# Patient Record
Sex: MALE | Race: OTHER
[De-identification: names, ages, dates, MRNs, and addresses within clinical notes are randomized per-mention and may not be internally consistent; named-entity substitution may affect disease eponyms.]

---

## 2017-06-24 ENCOUNTER — HOSPITAL ENCOUNTER (INPATIENT)
Dept: HOSPITAL 15 - ER | Age: 52
LOS: 2 days | Discharge: HOME | DRG: 425 | End: 2017-06-26
Attending: HOSPITALIST | Admitting: INTERNAL MEDICINE
Payer: COMMERCIAL

## 2017-06-24 VITALS — HEIGHT: 70 IN | WEIGHT: 172.62 LBS | BODY MASS INDEX: 24.71 KG/M2

## 2017-06-24 DIAGNOSIS — Z79.4: ICD-10-CM

## 2017-06-24 DIAGNOSIS — Z71.89: ICD-10-CM

## 2017-06-24 DIAGNOSIS — E87.1: Primary | ICD-10-CM

## 2017-06-24 DIAGNOSIS — Z82.49: ICD-10-CM

## 2017-06-24 DIAGNOSIS — T50.2X5A: ICD-10-CM

## 2017-06-24 DIAGNOSIS — Z83.3: ICD-10-CM

## 2017-06-24 DIAGNOSIS — E83.42: ICD-10-CM

## 2017-06-24 DIAGNOSIS — K29.00: ICD-10-CM

## 2017-06-24 DIAGNOSIS — E10.42: ICD-10-CM

## 2017-06-24 DIAGNOSIS — E10.65: ICD-10-CM

## 2017-06-24 DIAGNOSIS — K21.9: ICD-10-CM

## 2017-06-24 DIAGNOSIS — F17.210: ICD-10-CM

## 2017-06-24 DIAGNOSIS — F10.20: ICD-10-CM

## 2017-06-24 LAB
ALBUMIN SERPL-MCNC: 2.9 G/DL (ref 3.4–5)
ALBUMIN SERPL-MCNC: 3.7 G/DL (ref 3.4–5)
ALP SERPL-CCNC: 74 U/L (ref 45–117)
ALP SERPL-CCNC: 89 U/L (ref 45–117)
AMYLASE SERPL-CCNC: 40 U/L (ref 25–115)
ANION GAP SERPL CALCULATED.3IONS-SCNC: 11 MMOL/L (ref 5–15)
ANION GAP SERPL CALCULATED.3IONS-SCNC: 15 MMOL/L (ref 5–15)
APTT PPP: 28.3 SEC (ref 22.64–33.71)
BILIRUB SERPL-MCNC: 0.5 MG/DL (ref 0.2–1)
BILIRUB SERPL-MCNC: 0.9 MG/DL (ref 0.2–1)
BUN SERPL-MCNC: 7 MG/DL (ref 7–18)
BUN SERPL-MCNC: 8 MG/DL (ref 7–18)
BUN/CREAT SERPL: 11
BUN/CREAT SERPL: 11.3
CALCIUM SERPL-MCNC: 7.4 MG/DL (ref 8.5–10.1)
CALCIUM SERPL-MCNC: 8.5 MG/DL (ref 8.5–10.1)
CHLORIDE SERPL-SCNC: 78 MMOL/L (ref 98–107)
CHLORIDE SERPL-SCNC: 89 MMOL/L (ref 98–107)
CO2 SERPL-SCNC: 19 MMOL/L (ref 21–32)
CO2 SERPL-SCNC: 21 MMOL/L (ref 21–32)
GLUCOSE SERPL-MCNC: 235 MG/DL (ref 74–106)
GLUCOSE SERPL-MCNC: 269 MG/DL (ref 74–106)
HCT VFR BLD AUTO: 41.5 % (ref 41–53)
HGB BLD-MCNC: 14.4 G/DL (ref 13.5–17.5)
INR PPP: 1.03 (ref 0.9–1.15)
MAGNESIUM SERPL-MCNC: 1.2 MG/DL (ref 1.6–2.6)
MCH RBC QN AUTO: 34.6 PG (ref 28–32)
MCV RBC AUTO: 99.7 FL (ref 80–100)
NRBC BLD QL AUTO: 0 %
POTASSIUM SERPL-SCNC: 3.3 MMOL/L (ref 3.5–5.1)
POTASSIUM SERPL-SCNC: 4.3 MMOL/L (ref 3.5–5.1)
PROT SERPL-MCNC: 6.4 G/DL (ref 6.4–8.2)
PROT SERPL-MCNC: 7.8 G/DL (ref 6.4–8.2)
PROTHROMBIN TIME: 11.2 SEC (ref 9.37–12.3)
SODIUM SERPL-SCNC: 112 MMOL/L (ref 136–145)
SODIUM SERPL-SCNC: 121 MMOL/L (ref 136–145)

## 2017-06-24 PROCEDURE — 84295 ASSAY OF SERUM SODIUM: CPT

## 2017-06-24 PROCEDURE — 85730 THROMBOPLASTIN TIME PARTIAL: CPT

## 2017-06-24 PROCEDURE — 86141 C-REACTIVE PROTEIN HS: CPT

## 2017-06-24 PROCEDURE — 82607 VITAMIN B-12: CPT

## 2017-06-24 PROCEDURE — 82746 ASSAY OF FOLIC ACID SERUM: CPT

## 2017-06-24 PROCEDURE — 80061 LIPID PANEL: CPT

## 2017-06-24 PROCEDURE — 80048 BASIC METABOLIC PNL TOTAL CA: CPT

## 2017-06-24 PROCEDURE — 81001 URINALYSIS AUTO W/SCOPE: CPT

## 2017-06-24 PROCEDURE — 70450 CT HEAD/BRAIN W/O DYE: CPT

## 2017-06-24 PROCEDURE — 96367 TX/PROPH/DG ADDL SEQ IV INF: CPT

## 2017-06-24 PROCEDURE — 84443 ASSAY THYROID STIM HORMONE: CPT

## 2017-06-24 PROCEDURE — 93005 ELECTROCARDIOGRAM TRACING: CPT

## 2017-06-24 PROCEDURE — 36415 COLL VENOUS BLD VENIPUNCTURE: CPT

## 2017-06-24 PROCEDURE — 82962 GLUCOSE BLOOD TEST: CPT

## 2017-06-24 PROCEDURE — 80053 COMPREHEN METABOLIC PANEL: CPT

## 2017-06-24 PROCEDURE — 93306 TTE W/DOPPLER COMPLETE: CPT

## 2017-06-24 PROCEDURE — 96365 THER/PROPH/DIAG IV INF INIT: CPT

## 2017-06-24 PROCEDURE — 83036 HEMOGLOBIN GLYCOSYLATED A1C: CPT

## 2017-06-24 PROCEDURE — 86592 SYPHILIS TEST NON-TREP QUAL: CPT

## 2017-06-24 PROCEDURE — 80320 DRUG SCREEN QUANTALCOHOLS: CPT

## 2017-06-24 PROCEDURE — 96375 TX/PRO/DX INJ NEW DRUG ADDON: CPT

## 2017-06-24 PROCEDURE — 85610 PROTHROMBIN TIME: CPT

## 2017-06-24 PROCEDURE — 83735 ASSAY OF MAGNESIUM: CPT

## 2017-06-24 PROCEDURE — 71010: CPT

## 2017-06-24 PROCEDURE — 84484 ASSAY OF TROPONIN QUANT: CPT

## 2017-06-24 PROCEDURE — 96361 HYDRATE IV INFUSION ADD-ON: CPT

## 2017-06-24 PROCEDURE — 94761 N-INVAS EAR/PLS OXIMETRY MLT: CPT

## 2017-06-24 PROCEDURE — 85025 COMPLETE CBC W/AUTO DIFF WBC: CPT

## 2017-06-24 PROCEDURE — 87086 URINE CULTURE/COLONY COUNT: CPT

## 2017-06-24 PROCEDURE — 82150 ASSAY OF AMYLASE: CPT

## 2017-06-24 PROCEDURE — 84425 ASSAY OF VITAMIN B-1: CPT

## 2017-06-24 PROCEDURE — 83690 ASSAY OF LIPASE: CPT

## 2017-06-24 PROCEDURE — 85652 RBC SED RATE AUTOMATED: CPT

## 2017-06-24 RX ADMIN — SODIUM CHLORIDE SCH MLS/HR: 0.9 INJECTION, SOLUTION INTRAVENOUS at 22:26

## 2017-06-24 RX ADMIN — MORPHINE SULFATE PRN MG: 2 INJECTION, SOLUTION INTRAMUSCULAR; INTRAVENOUS at 18:18

## 2017-06-24 RX ADMIN — SODIUM CHLORIDE SCH MLS/HR: 0.9 INJECTION, SOLUTION INTRAVENOUS at 22:22

## 2017-06-24 RX ADMIN — HUMAN INSULIN SCH UNITS: 100 INJECTION, SOLUTION SUBCUTANEOUS at 20:00

## 2017-06-24 RX ADMIN — Medication SCH STRIP: at 16:59

## 2017-06-24 RX ADMIN — SODIUM CHLORIDE SCH MG: 9 INJECTION, SOLUTION INTRAVENOUS at 16:05

## 2017-06-24 RX ADMIN — MAGNESIUM SULFATE IN DEXTROSE SCH MLS/HR: 10 INJECTION, SOLUTION INTRAVENOUS at 17:08

## 2017-06-24 RX ADMIN — MAGNESIUM SULFATE IN DEXTROSE SCH MLS/HR: 10 INJECTION, SOLUTION INTRAVENOUS at 18:48

## 2017-06-24 RX ADMIN — Medication SCH STRIP: at 20:00

## 2017-06-24 RX ADMIN — SODIUM CHLORIDE SCH MLS/HR: 0.9 INJECTION, SOLUTION INTRAVENOUS at 16:49

## 2017-06-24 RX ADMIN — HUMAN INSULIN SCH UNITS: 100 INJECTION, SOLUTION SUBCUTANEOUS at 17:05

## 2017-06-25 VITALS — DIASTOLIC BLOOD PRESSURE: 81 MMHG | SYSTOLIC BLOOD PRESSURE: 136 MMHG

## 2017-06-25 VITALS — SYSTOLIC BLOOD PRESSURE: 60 MMHG

## 2017-06-25 VITALS — DIASTOLIC BLOOD PRESSURE: 92 MMHG | SYSTOLIC BLOOD PRESSURE: 140 MMHG

## 2017-06-25 VITALS — SYSTOLIC BLOOD PRESSURE: 140 MMHG | DIASTOLIC BLOOD PRESSURE: 92 MMHG

## 2017-06-25 VITALS — DIASTOLIC BLOOD PRESSURE: 80 MMHG | SYSTOLIC BLOOD PRESSURE: 143 MMHG

## 2017-06-25 VITALS — DIASTOLIC BLOOD PRESSURE: 85 MMHG | SYSTOLIC BLOOD PRESSURE: 140 MMHG

## 2017-06-25 VITALS — DIASTOLIC BLOOD PRESSURE: 58 MMHG | SYSTOLIC BLOOD PRESSURE: 114 MMHG

## 2017-06-25 VITALS — SYSTOLIC BLOOD PRESSURE: 154 MMHG | DIASTOLIC BLOOD PRESSURE: 84 MMHG

## 2017-06-25 LAB
ALBUMIN SERPL-MCNC: 2.8 G/DL (ref 3.4–5)
ALP SERPL-CCNC: 68 U/L (ref 45–117)
ANION GAP SERPL CALCULATED.3IONS-SCNC: 11 MMOL/L (ref 5–15)
BILIRUB SERPL-MCNC: 0.4 MG/DL (ref 0.2–1)
BUN SERPL-MCNC: 6 MG/DL (ref 7–18)
BUN/CREAT SERPL: 10.3
CALCIUM SERPL-MCNC: 7.3 MG/DL (ref 8.5–10.1)
CHLORIDE SERPL-SCNC: 89 MMOL/L (ref 98–107)
CHOLEST SERPL-MCNC: 117 MG/DL (ref ?–200)
CO2 SERPL-SCNC: 22 MMOL/L (ref 21–32)
GLUCOSE SERPL-MCNC: 100 MG/DL (ref 74–106)
HCT VFR BLD AUTO: 36.8 % (ref 41–53)
HDLC SERPL-MCNC: 68 MG/DL (ref 40–59)
HGB BLD-MCNC: 12.7 G/DL (ref 13.5–17.5)
MCH RBC QN AUTO: 34.7 PG (ref 28–32)
MCV RBC AUTO: 100.9 FL (ref 80–100)
NRBC BLD QL AUTO: 0 %
POTASSIUM SERPL-SCNC: 2.8 MMOL/L (ref 3.5–5.1)
PROT SERPL-MCNC: 6 G/DL (ref 6.4–8.2)
SODIUM SERPL-SCNC: 122 MMOL/L (ref 136–145)
TRIGL SERPL-MCNC: 73 MG/DL (ref ?–150)

## 2017-06-25 RX ADMIN — SODIUM CHLORIDE SCH MG: 9 INJECTION, SOLUTION INTRAVENOUS at 13:21

## 2017-06-25 RX ADMIN — MORPHINE SULFATE PRN MG: 2 INJECTION, SOLUTION INTRAMUSCULAR; INTRAVENOUS at 12:06

## 2017-06-25 RX ADMIN — MORPHINE SULFATE PRN MG: 2 INJECTION, SOLUTION INTRAMUSCULAR; INTRAVENOUS at 06:50

## 2017-06-25 RX ADMIN — SODIUM CHLORIDE SCH MG: 9 INJECTION, SOLUTION INTRAVENOUS at 02:55

## 2017-06-25 RX ADMIN — HUMAN INSULIN SCH UNITS: 100 INJECTION, SOLUTION SUBCUTANEOUS at 08:00

## 2017-06-25 RX ADMIN — MAGNESIUM SULFATE IN DEXTROSE SCH MLS/HR: 10 INJECTION, SOLUTION INTRAVENOUS at 15:49

## 2017-06-25 RX ADMIN — MORPHINE SULFATE PRN MG: 2 INJECTION, SOLUTION INTRAMUSCULAR; INTRAVENOUS at 15:50

## 2017-06-25 RX ADMIN — HUMAN INSULIN SCH UNITS: 100 INJECTION, SOLUTION SUBCUTANEOUS at 20:24

## 2017-06-25 RX ADMIN — HUMAN INSULIN SCH UNITS: 100 INJECTION, SOLUTION SUBCUTANEOUS at 12:00

## 2017-06-25 RX ADMIN — Medication SCH STRIP: at 03:43

## 2017-06-25 RX ADMIN — MORPHINE SULFATE PRN MG: 2 INJECTION, SOLUTION INTRAMUSCULAR; INTRAVENOUS at 20:25

## 2017-06-25 RX ADMIN — HUMAN INSULIN SCH UNITS: 100 INJECTION, SOLUTION SUBCUTANEOUS at 00:00

## 2017-06-25 RX ADMIN — SODIUM CHLORIDE SCH MLS/HR: 0.9 INJECTION, SOLUTION INTRAVENOUS at 21:45

## 2017-06-25 RX ADMIN — Medication SCH STRIP: at 12:05

## 2017-06-25 RX ADMIN — HUMAN INSULIN SCH UNITS: 100 INJECTION, SOLUTION SUBCUTANEOUS at 03:43

## 2017-06-25 RX ADMIN — HUMAN INSULIN SCH UNITS: 100 INJECTION, SOLUTION SUBCUTANEOUS at 16:00

## 2017-06-25 RX ADMIN — SODIUM CHLORIDE SCH MLS/HR: 0.9 INJECTION, SOLUTION INTRAVENOUS at 02:21

## 2017-06-25 RX ADMIN — Medication SCH STRIP: at 15:49

## 2017-06-25 RX ADMIN — Medication SCH STRIP: at 20:24

## 2017-06-25 RX ADMIN — SODIUM CHLORIDE SCH MG: 9 INJECTION, SOLUTION INTRAVENOUS at 09:28

## 2017-06-25 RX ADMIN — Medication SCH STRIP: at 08:36

## 2017-06-25 RX ADMIN — MAGNESIUM SULFATE IN DEXTROSE SCH MLS/HR: 10 INJECTION, SOLUTION INTRAVENOUS at 17:21

## 2017-06-25 RX ADMIN — MORPHINE SULFATE PRN MG: 2 INJECTION, SOLUTION INTRAMUSCULAR; INTRAVENOUS at 02:21

## 2017-06-25 RX ADMIN — Medication SCH STRIP: at 00:23

## 2017-06-26 VITALS — DIASTOLIC BLOOD PRESSURE: 77 MMHG | SYSTOLIC BLOOD PRESSURE: 132 MMHG

## 2017-06-26 VITALS — SYSTOLIC BLOOD PRESSURE: 132 MMHG | DIASTOLIC BLOOD PRESSURE: 77 MMHG

## 2017-06-26 VITALS — SYSTOLIC BLOOD PRESSURE: 139 MMHG | DIASTOLIC BLOOD PRESSURE: 82 MMHG

## 2017-06-26 LAB
ANION GAP SERPL CALCULATED.3IONS-SCNC: 6 MMOL/L (ref 5–15)
BUN SERPL-MCNC: 10 MG/DL (ref 7–18)
BUN/CREAT SERPL: 12.7
CALCIUM SERPL-MCNC: 8.6 MG/DL (ref 8.5–10.1)
CHLORIDE SERPL-SCNC: 102 MMOL/L (ref 98–107)
CO2 SERPL-SCNC: 25 MMOL/L (ref 21–32)
GLUCOSE SERPL-MCNC: 152 MG/DL (ref 74–106)
HCT VFR BLD AUTO: 38.3 % (ref 41–53)
HGB BLD-MCNC: 13.1 G/DL (ref 13.5–17.5)
MCH RBC QN AUTO: 34.7 PG (ref 28–32)
MCV RBC AUTO: 101.4 FL (ref 80–100)
NRBC BLD QL AUTO: 0 %
POTASSIUM SERPL-SCNC: 4.9 MMOL/L (ref 3.5–5.1)
SODIUM SERPL-SCNC: 133 MMOL/L (ref 136–145)
TEMPERATURE:: 24.1 C (ref 20–25)
VIT B12 SERPL-MCNC: 442 PG/ML (ref 211–911)

## 2017-06-26 RX ADMIN — SODIUM CHLORIDE SCH MG: 9 INJECTION, SOLUTION INTRAVENOUS at 04:05

## 2017-06-26 RX ADMIN — Medication SCH STRIP: at 08:00

## 2017-06-26 RX ADMIN — MORPHINE SULFATE PRN MG: 2 INJECTION, SOLUTION INTRAMUSCULAR; INTRAVENOUS at 00:23

## 2017-06-26 RX ADMIN — HUMAN INSULIN SCH UNITS: 100 INJECTION, SOLUTION SUBCUTANEOUS at 00:00

## 2017-06-26 RX ADMIN — HUMAN INSULIN SCH UNITS: 100 INJECTION, SOLUTION SUBCUTANEOUS at 09:11

## 2017-06-26 RX ADMIN — MORPHINE SULFATE PRN MG: 2 INJECTION, SOLUTION INTRAMUSCULAR; INTRAVENOUS at 08:36

## 2017-06-26 RX ADMIN — Medication SCH STRIP: at 00:22

## 2017-06-26 RX ADMIN — MORPHINE SULFATE PRN MG: 2 INJECTION, SOLUTION INTRAMUSCULAR; INTRAVENOUS at 04:25

## 2017-06-26 RX ADMIN — Medication SCH STRIP: at 12:00

## 2017-06-26 RX ADMIN — SODIUM CHLORIDE SCH MLS/HR: 0.9 INJECTION, SOLUTION INTRAVENOUS at 07:45

## 2017-06-26 RX ADMIN — Medication SCH STRIP: at 04:23

## 2017-06-26 RX ADMIN — HUMAN INSULIN SCH UNITS: 100 INJECTION, SOLUTION SUBCUTANEOUS at 04:00

## 2017-06-26 RX ADMIN — HUMAN INSULIN SCH UNITS: 100 INJECTION, SOLUTION SUBCUTANEOUS at 12:00

## 2017-06-26 RX ADMIN — SODIUM CHLORIDE SCH MG: 9 INJECTION, SOLUTION INTRAVENOUS at 10:00

## 2017-06-29 LAB — VIT B1 BLD-SCNC: 240.4 NMOL/L (ref 66.5–200)

## 2017-10-29 ENCOUNTER — HOSPITAL ENCOUNTER (INPATIENT)
Dept: HOSPITAL 15 - ER | Age: 52
LOS: 4 days | Discharge: HOME | DRG: 52 | End: 2017-11-02
Attending: INTERNAL MEDICINE | Admitting: INTERNAL MEDICINE
Payer: MEDICAID

## 2017-10-29 VITALS — DIASTOLIC BLOOD PRESSURE: 76 MMHG | SYSTOLIC BLOOD PRESSURE: 128 MMHG

## 2017-10-29 VITALS — HEIGHT: 70 IN | WEIGHT: 170.64 LBS | BODY MASS INDEX: 24.43 KG/M2

## 2017-10-29 VITALS — DIASTOLIC BLOOD PRESSURE: 96 MMHG | SYSTOLIC BLOOD PRESSURE: 150 MMHG

## 2017-10-29 DIAGNOSIS — F10.231: ICD-10-CM

## 2017-10-29 DIAGNOSIS — G40.509: ICD-10-CM

## 2017-10-29 DIAGNOSIS — Z79.899: ICD-10-CM

## 2017-10-29 DIAGNOSIS — Z83.3: ICD-10-CM

## 2017-10-29 DIAGNOSIS — K21.9: ICD-10-CM

## 2017-10-29 DIAGNOSIS — I10: ICD-10-CM

## 2017-10-29 DIAGNOSIS — E11.65: ICD-10-CM

## 2017-10-29 DIAGNOSIS — F17.210: ICD-10-CM

## 2017-10-29 DIAGNOSIS — F10.21: ICD-10-CM

## 2017-10-29 DIAGNOSIS — Z90.89: ICD-10-CM

## 2017-10-29 DIAGNOSIS — J44.9: ICD-10-CM

## 2017-10-29 DIAGNOSIS — Z82.49: ICD-10-CM

## 2017-10-29 DIAGNOSIS — G93.41: Primary | ICD-10-CM

## 2017-10-29 LAB
ALBUMIN SERPL-MCNC: 3.8 G/DL (ref 3.4–5)
ALCOHOL, URINE: < 3 MG/DL (ref 0–5)
ALP SERPL-CCNC: 81 U/L (ref 45–117)
ALT SERPL-CCNC: 39 U/L (ref 16–61)
AMPHETAMINES UR QL SCN: NEGATIVE
ANION GAP SERPL CALCULATED.3IONS-SCNC: 10 MMOL/L (ref 5–15)
BARBITURATES UR QL SCN: NEGATIVE
BENZODIAZ UR QL SCN: NEGATIVE
BILIRUB SERPL-MCNC: 0.3 MG/DL (ref 0.2–1)
BUN SERPL-MCNC: 14 MG/DL (ref 7–18)
BUN/CREAT SERPL: 15.7
BZE UR QL SCN: NEGATIVE
CALCIUM SERPL-MCNC: 9.1 MG/DL (ref 8.5–10.1)
CANNABINOIDS UR QL SCN: NEGATIVE
CHLORIDE SERPL-SCNC: 106 MMOL/L (ref 98–107)
CO2 SERPL-SCNC: 24 MMOL/L (ref 21–32)
GLUCOSE SERPL-MCNC: 182 MG/DL (ref 74–106)
HCT VFR BLD AUTO: 43.6 % (ref 41–53)
HGB BLD-MCNC: 15 G/DL (ref 13.5–17.5)
MAGNESIUM SERPL-MCNC: 2 MG/DL (ref 1.6–2.6)
MCH RBC QN AUTO: 34.5 PG (ref 28–32)
MCV RBC AUTO: 100.2 FL (ref 80–100)
NRBC BLD QL AUTO: 0.1 %
OPIATES UR QL SCN: NEGATIVE
PCP UR QL SCN: NEGATIVE
POTASSIUM SERPL-SCNC: 4.2 MMOL/L (ref 3.5–5.1)
PROT SERPL-MCNC: 8.3 G/DL (ref 6.4–8.2)
SODIUM SERPL-SCNC: 140 MMOL/L (ref 136–145)

## 2017-10-29 PROCEDURE — 80053 COMPREHEN METABOLIC PANEL: CPT

## 2017-10-29 PROCEDURE — 36415 COLL VENOUS BLD VENIPUNCTURE: CPT

## 2017-10-29 PROCEDURE — 70450 CT HEAD/BRAIN W/O DYE: CPT

## 2017-10-29 PROCEDURE — 81001 URINALYSIS AUTO W/SCOPE: CPT

## 2017-10-29 PROCEDURE — 82607 VITAMIN B-12: CPT

## 2017-10-29 PROCEDURE — 80307 DRUG TEST PRSMV CHEM ANLYZR: CPT

## 2017-10-29 PROCEDURE — 93005 ELECTROCARDIOGRAM TRACING: CPT

## 2017-10-29 PROCEDURE — 96374 THER/PROPH/DIAG INJ IV PUSH: CPT

## 2017-10-29 PROCEDURE — 83735 ASSAY OF MAGNESIUM: CPT

## 2017-10-29 PROCEDURE — 84443 ASSAY THYROID STIM HORMONE: CPT

## 2017-10-29 PROCEDURE — 90715 TDAP VACCINE 7 YRS/> IM: CPT

## 2017-10-29 PROCEDURE — 96372 THER/PROPH/DIAG INJ SC/IM: CPT

## 2017-10-29 PROCEDURE — 85025 COMPLETE CBC W/AUTO DIFF WBC: CPT

## 2017-10-29 PROCEDURE — 82962 GLUCOSE BLOOD TEST: CPT

## 2017-10-29 PROCEDURE — 84484 ASSAY OF TROPONIN QUANT: CPT

## 2017-10-29 PROCEDURE — 80320 DRUG SCREEN QUANTALCOHOLS: CPT

## 2017-10-29 PROCEDURE — 83036 HEMOGLOBIN GLYCOSYLATED A1C: CPT

## 2017-10-29 RX ADMIN — FAMOTIDINE SCH MG: 20 TABLET, FILM COATED ORAL at 22:45

## 2017-10-29 RX ADMIN — SODIUM CHLORIDE SCH MLS/HR: 0.9 INJECTION, SOLUTION INTRAVENOUS at 23:17

## 2017-10-29 RX ADMIN — HUMAN INSULIN SCH UNITS: 100 INJECTION, SOLUTION SUBCUTANEOUS at 22:00

## 2017-10-29 RX ADMIN — ATENOLOL SCH MG: 25 TABLET ORAL at 22:44

## 2017-10-29 RX ADMIN — SODIUM CHLORIDE SCH MLS/HR: 0.9 INJECTION, SOLUTION INTRAVENOUS at 17:40

## 2017-10-29 RX ADMIN — GABAPENTIN SCH MG: 300 CAPSULE ORAL at 22:45

## 2017-10-29 RX ADMIN — Medication SCH GM: at 22:45

## 2017-10-29 RX ADMIN — Medication SCH GM: at 17:00

## 2017-10-29 RX ADMIN — AMITRIPTYLINE HYDROCHLORIDE SCH MG: 25 TABLET, FILM COATED ORAL at 22:45

## 2017-10-29 RX ADMIN — Medication SCH STRIP: at 17:40

## 2017-10-29 RX ADMIN — HUMAN INSULIN SCH UNITS: 100 INJECTION, SOLUTION SUBCUTANEOUS at 17:40

## 2017-10-29 RX ADMIN — Medication SCH STRIP: at 22:00

## 2017-10-30 VITALS — DIASTOLIC BLOOD PRESSURE: 97 MMHG | SYSTOLIC BLOOD PRESSURE: 144 MMHG

## 2017-10-30 VITALS — DIASTOLIC BLOOD PRESSURE: 70 MMHG | SYSTOLIC BLOOD PRESSURE: 116 MMHG

## 2017-10-30 VITALS — DIASTOLIC BLOOD PRESSURE: 88 MMHG | SYSTOLIC BLOOD PRESSURE: 132 MMHG

## 2017-10-30 VITALS — SYSTOLIC BLOOD PRESSURE: 144 MMHG | DIASTOLIC BLOOD PRESSURE: 88 MMHG

## 2017-10-30 VITALS — DIASTOLIC BLOOD PRESSURE: 89 MMHG | SYSTOLIC BLOOD PRESSURE: 133 MMHG

## 2017-10-30 LAB
ALBUMIN SERPL-MCNC: 3 G/DL (ref 3.4–5)
ALP SERPL-CCNC: 68 U/L (ref 45–117)
ALT SERPL-CCNC: 32 U/L (ref 16–61)
ANION GAP SERPL CALCULATED.3IONS-SCNC: 7 MMOL/L (ref 5–15)
BILIRUB SERPL-MCNC: 0.4 MG/DL (ref 0.2–1)
BUN SERPL-MCNC: 16 MG/DL (ref 7–18)
BUN/CREAT SERPL: 20
CALCIUM SERPL-MCNC: 8.2 MG/DL (ref 8.5–10.1)
CHLORIDE SERPL-SCNC: 107 MMOL/L (ref 98–107)
CO2 SERPL-SCNC: 26 MMOL/L (ref 21–32)
GLUCOSE SERPL-MCNC: 96 MG/DL (ref 74–106)
HCT VFR BLD AUTO: 38.7 % (ref 41–53)
HGB BLD-MCNC: 12.9 G/DL (ref 13.5–17.5)
MCH RBC QN AUTO: 33.4 PG (ref 28–32)
MCV RBC AUTO: 100.2 FL (ref 80–100)
NRBC BLD QL AUTO: 0.1 %
POTASSIUM SERPL-SCNC: 4.1 MMOL/L (ref 3.5–5.1)
PROT SERPL-MCNC: 6.8 G/DL (ref 6.4–8.2)
SODIUM SERPL-SCNC: 140 MMOL/L (ref 136–145)

## 2017-10-30 RX ADMIN — Medication SCH STRIP: at 22:22

## 2017-10-30 RX ADMIN — Medication SCH STRIP: at 18:29

## 2017-10-30 RX ADMIN — PANTOPRAZOLE SODIUM SCH MG: 40 TABLET, DELAYED RELEASE ORAL at 09:49

## 2017-10-30 RX ADMIN — SODIUM CHLORIDE SCH MLS/HR: 0.9 INJECTION, SOLUTION INTRAVENOUS at 17:08

## 2017-10-30 RX ADMIN — GABAPENTIN SCH MG: 300 CAPSULE ORAL at 22:04

## 2017-10-30 RX ADMIN — MULTIVITAMIN TABLET SCH TAB: TABLET at 09:49

## 2017-10-30 RX ADMIN — ATENOLOL SCH MG: 25 TABLET ORAL at 22:22

## 2017-10-30 RX ADMIN — FAMOTIDINE SCH MG: 20 TABLET, FILM COATED ORAL at 09:49

## 2017-10-30 RX ADMIN — VITAMIN D, TAB 1000IU (100/BT) SCH UNIT: 25 TAB at 09:50

## 2017-10-30 RX ADMIN — HUMAN INSULIN SCH UNITS: 100 INJECTION, SOLUTION SUBCUTANEOUS at 11:32

## 2017-10-30 RX ADMIN — AMITRIPTYLINE HYDROCHLORIDE SCH MG: 25 TABLET, FILM COATED ORAL at 22:04

## 2017-10-30 RX ADMIN — Medication SCH STRIP: at 11:31

## 2017-10-30 RX ADMIN — SODIUM CHLORIDE SCH MLS/HR: 0.9 INJECTION, SOLUTION INTRAVENOUS at 08:01

## 2017-10-30 RX ADMIN — FAMOTIDINE SCH MG: 20 TABLET, FILM COATED ORAL at 22:05

## 2017-10-30 RX ADMIN — GABAPENTIN SCH MG: 300 CAPSULE ORAL at 14:35

## 2017-10-30 RX ADMIN — Medication SCH GM: at 22:21

## 2017-10-30 RX ADMIN — Medication SCH GM: at 11:31

## 2017-10-30 RX ADMIN — HUMAN INSULIN SCH UNITS: 100 INJECTION, SOLUTION SUBCUTANEOUS at 18:29

## 2017-10-30 RX ADMIN — Medication SCH GM: at 17:22

## 2017-10-30 RX ADMIN — ATENOLOL SCH MG: 25 TABLET ORAL at 10:00

## 2017-10-30 RX ADMIN — Medication SCH STRIP: at 06:25

## 2017-10-30 RX ADMIN — GABAPENTIN SCH MG: 300 CAPSULE ORAL at 06:25

## 2017-10-30 RX ADMIN — Medication SCH GM: at 06:25

## 2017-10-30 RX ADMIN — HUMAN INSULIN SCH UNITS: 100 INJECTION, SOLUTION SUBCUTANEOUS at 22:22

## 2017-10-30 RX ADMIN — HUMAN INSULIN SCH UNITS: 100 INJECTION, SOLUTION SUBCUTANEOUS at 06:25

## 2017-10-31 VITALS — DIASTOLIC BLOOD PRESSURE: 87 MMHG | SYSTOLIC BLOOD PRESSURE: 160 MMHG

## 2017-10-31 VITALS — SYSTOLIC BLOOD PRESSURE: 157 MMHG | DIASTOLIC BLOOD PRESSURE: 92 MMHG

## 2017-10-31 VITALS — SYSTOLIC BLOOD PRESSURE: 156 MMHG | DIASTOLIC BLOOD PRESSURE: 92 MMHG

## 2017-10-31 VITALS — SYSTOLIC BLOOD PRESSURE: 171 MMHG | DIASTOLIC BLOOD PRESSURE: 98 MMHG

## 2017-10-31 VITALS — DIASTOLIC BLOOD PRESSURE: 96 MMHG | SYSTOLIC BLOOD PRESSURE: 149 MMHG

## 2017-10-31 RX ADMIN — Medication SCH STRIP: at 22:00

## 2017-10-31 RX ADMIN — AMITRIPTYLINE HYDROCHLORIDE SCH MG: 25 TABLET, FILM COATED ORAL at 23:23

## 2017-10-31 RX ADMIN — HUMAN INSULIN SCH UNITS: 100 INJECTION, SOLUTION SUBCUTANEOUS at 22:00

## 2017-10-31 RX ADMIN — HUMAN INSULIN SCH UNITS: 100 INJECTION, SOLUTION SUBCUTANEOUS at 17:00

## 2017-10-31 RX ADMIN — FAMOTIDINE SCH MG: 20 TABLET, FILM COATED ORAL at 10:00

## 2017-10-31 RX ADMIN — HUMAN INSULIN SCH UNITS: 100 INJECTION, SOLUTION SUBCUTANEOUS at 11:30

## 2017-10-31 RX ADMIN — ATENOLOL SCH MG: 25 TABLET ORAL at 10:00

## 2017-10-31 RX ADMIN — SODIUM CHLORIDE SCH MLS/HR: 0.9 INJECTION, SOLUTION INTRAVENOUS at 06:36

## 2017-10-31 RX ADMIN — SODIUM CHLORIDE SCH MLS/HR: 0.9 INJECTION, SOLUTION INTRAVENOUS at 17:01

## 2017-10-31 RX ADMIN — Medication SCH STRIP: at 17:00

## 2017-10-31 RX ADMIN — GABAPENTIN SCH MG: 300 CAPSULE ORAL at 05:44

## 2017-10-31 RX ADMIN — HUMAN INSULIN SCH UNITS: 100 INJECTION, SOLUTION SUBCUTANEOUS at 06:05

## 2017-10-31 RX ADMIN — Medication SCH GM: at 17:00

## 2017-10-31 RX ADMIN — ATENOLOL SCH MG: 25 TABLET ORAL at 23:29

## 2017-10-31 RX ADMIN — Medication SCH STRIP: at 06:05

## 2017-10-31 RX ADMIN — GABAPENTIN SCH MG: 300 CAPSULE ORAL at 14:00

## 2017-10-31 RX ADMIN — FAMOTIDINE SCH MG: 20 TABLET, FILM COATED ORAL at 23:25

## 2017-10-31 RX ADMIN — Medication SCH GM: at 23:25

## 2017-10-31 RX ADMIN — SODIUM CHLORIDE SCH MLS/HR: 0.9 INJECTION, SOLUTION INTRAVENOUS at 00:06

## 2017-10-31 RX ADMIN — Medication SCH STRIP: at 11:30

## 2017-10-31 RX ADMIN — GABAPENTIN SCH MG: 300 CAPSULE ORAL at 23:24

## 2017-10-31 RX ADMIN — MULTIVITAMIN TABLET SCH TAB: TABLET at 10:00

## 2017-10-31 RX ADMIN — Medication SCH GM: at 11:14

## 2017-10-31 RX ADMIN — Medication SCH GM: at 06:05

## 2017-10-31 RX ADMIN — PANTOPRAZOLE SODIUM SCH MG: 40 TABLET, DELAYED RELEASE ORAL at 10:00

## 2017-10-31 RX ADMIN — VITAMIN D, TAB 1000IU (100/BT) SCH UNIT: 25 TAB at 10:00

## 2017-11-01 VITALS — DIASTOLIC BLOOD PRESSURE: 104 MMHG | SYSTOLIC BLOOD PRESSURE: 178 MMHG

## 2017-11-01 VITALS — SYSTOLIC BLOOD PRESSURE: 172 MMHG | DIASTOLIC BLOOD PRESSURE: 99 MMHG

## 2017-11-01 VITALS — DIASTOLIC BLOOD PRESSURE: 77 MMHG | SYSTOLIC BLOOD PRESSURE: 126 MMHG

## 2017-11-01 VITALS — SYSTOLIC BLOOD PRESSURE: 170 MMHG | DIASTOLIC BLOOD PRESSURE: 99 MMHG

## 2017-11-01 RX ADMIN — GABAPENTIN SCH MG: 300 CAPSULE ORAL at 16:00

## 2017-11-01 RX ADMIN — MULTIVITAMIN TABLET SCH TAB: TABLET at 09:19

## 2017-11-01 RX ADMIN — HUMAN INSULIN SCH UNITS: 100 INJECTION, SOLUTION SUBCUTANEOUS at 17:00

## 2017-11-01 RX ADMIN — PANTOPRAZOLE SODIUM SCH MG: 40 TABLET, DELAYED RELEASE ORAL at 09:19

## 2017-11-01 RX ADMIN — Medication SCH STRIP: at 22:00

## 2017-11-01 RX ADMIN — VITAMIN D, TAB 1000IU (100/BT) SCH UNIT: 25 TAB at 09:19

## 2017-11-01 RX ADMIN — Medication SCH STRIP: at 07:00

## 2017-11-01 RX ADMIN — AMITRIPTYLINE HYDROCHLORIDE SCH MG: 25 TABLET, FILM COATED ORAL at 21:55

## 2017-11-01 RX ADMIN — Medication SCH GM: at 21:53

## 2017-11-01 RX ADMIN — Medication SCH GM: at 06:50

## 2017-11-01 RX ADMIN — Medication SCH GM: at 12:03

## 2017-11-01 RX ADMIN — GABAPENTIN SCH MG: 300 CAPSULE ORAL at 21:53

## 2017-11-01 RX ADMIN — Medication SCH STRIP: at 12:03

## 2017-11-01 RX ADMIN — HUMAN INSULIN SCH UNITS: 100 INJECTION, SOLUTION SUBCUTANEOUS at 06:58

## 2017-11-01 RX ADMIN — Medication SCH GM: at 17:41

## 2017-11-01 RX ADMIN — Medication SCH STRIP: at 17:40

## 2017-11-01 RX ADMIN — SODIUM CHLORIDE SCH MLS/HR: 0.9 INJECTION, SOLUTION INTRAVENOUS at 09:26

## 2017-11-01 RX ADMIN — SODIUM CHLORIDE SCH MLS/HR: 0.9 INJECTION, SOLUTION INTRAVENOUS at 01:06

## 2017-11-01 RX ADMIN — ATENOLOL SCH MG: 25 TABLET ORAL at 21:54

## 2017-11-01 RX ADMIN — FAMOTIDINE SCH MG: 20 TABLET, FILM COATED ORAL at 21:53

## 2017-11-01 RX ADMIN — FAMOTIDINE SCH MG: 20 TABLET, FILM COATED ORAL at 09:20

## 2017-11-01 RX ADMIN — GABAPENTIN SCH MG: 300 CAPSULE ORAL at 06:27

## 2017-11-01 RX ADMIN — HUMAN INSULIN SCH UNITS: 100 INJECTION, SOLUTION SUBCUTANEOUS at 12:03

## 2017-11-01 RX ADMIN — HUMAN INSULIN SCH UNITS: 100 INJECTION, SOLUTION SUBCUTANEOUS at 22:00

## 2017-11-01 RX ADMIN — ATENOLOL SCH MG: 25 TABLET ORAL at 09:21

## 2017-11-01 RX ADMIN — SODIUM CHLORIDE SCH MLS/HR: 0.9 INJECTION, SOLUTION INTRAVENOUS at 18:31

## 2017-11-02 VITALS — DIASTOLIC BLOOD PRESSURE: 100 MMHG | SYSTOLIC BLOOD PRESSURE: 172 MMHG

## 2017-11-02 VITALS — SYSTOLIC BLOOD PRESSURE: 160 MMHG | DIASTOLIC BLOOD PRESSURE: 100 MMHG

## 2017-11-02 VITALS — DIASTOLIC BLOOD PRESSURE: 88 MMHG | SYSTOLIC BLOOD PRESSURE: 150 MMHG

## 2017-11-02 RX ADMIN — Medication SCH STRIP: at 06:41

## 2017-11-02 RX ADMIN — SODIUM CHLORIDE SCH MLS/HR: 0.9 INJECTION, SOLUTION INTRAVENOUS at 11:46

## 2017-11-02 RX ADMIN — HUMAN INSULIN SCH UNITS: 100 INJECTION, SOLUTION SUBCUTANEOUS at 11:46

## 2017-11-02 RX ADMIN — HUMAN INSULIN SCH UNITS: 100 INJECTION, SOLUTION SUBCUTANEOUS at 06:38

## 2017-11-02 RX ADMIN — Medication SCH GM: at 11:46

## 2017-11-02 RX ADMIN — Medication SCH GM: at 06:41

## 2017-11-02 RX ADMIN — PANTOPRAZOLE SODIUM SCH MG: 40 TABLET, DELAYED RELEASE ORAL at 09:45

## 2017-11-02 RX ADMIN — Medication SCH STRIP: at 11:47

## 2017-11-02 RX ADMIN — FAMOTIDINE SCH MG: 20 TABLET, FILM COATED ORAL at 09:45

## 2017-11-02 RX ADMIN — SODIUM CHLORIDE SCH MLS/HR: 0.9 INJECTION, SOLUTION INTRAVENOUS at 02:16

## 2017-11-02 RX ADMIN — MULTIVITAMIN TABLET SCH TAB: TABLET at 09:45

## 2017-11-02 RX ADMIN — VITAMIN D, TAB 1000IU (100/BT) SCH UNIT: 25 TAB at 09:45

## 2017-11-02 RX ADMIN — ATENOLOL SCH MG: 25 TABLET ORAL at 09:45

## 2017-11-02 RX ADMIN — GABAPENTIN SCH MG: 300 CAPSULE ORAL at 06:06

## 2017-12-22 ENCOUNTER — HOSPITAL ENCOUNTER (EMERGENCY)
Dept: HOSPITAL 15 - ER | Age: 52
Discharge: HOME | End: 2017-12-22
Payer: MEDICAID

## 2017-12-22 VITALS — BODY MASS INDEX: 23.77 KG/M2 | HEIGHT: 70 IN | WEIGHT: 166 LBS

## 2017-12-22 VITALS — SYSTOLIC BLOOD PRESSURE: 140 MMHG | DIASTOLIC BLOOD PRESSURE: 92 MMHG

## 2017-12-22 DIAGNOSIS — F10.239: Primary | ICD-10-CM

## 2017-12-22 DIAGNOSIS — Z83.3: ICD-10-CM

## 2017-12-22 DIAGNOSIS — K21.9: ICD-10-CM
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
## 2017-12-22 DIAGNOSIS — E11.9: ICD-10-CM

## 2017-12-22 DIAGNOSIS — I10: ICD-10-CM

## 2017-12-22 DIAGNOSIS — F17.210: ICD-10-CM

## 2017-12-22 LAB
ALBUMIN SERPL-MCNC: 3.3 G/DL (ref 3.4–5)
ALP SERPL-CCNC: 64 U/L (ref 45–117)
ANION GAP SERPL CALCULATED.3IONS-SCNC: 8 MMOL/L (ref 5–15)
BILIRUB SERPL-MCNC: 0.4 MG/DL (ref 0.2–1)
BUN SERPL-MCNC: 8 MG/DL (ref 7–18)
BUN/CREAT SERPL: 11.1
CALCIUM SERPL-MCNC: 8.7 MG/DL (ref 8.5–10.1)
CHLORIDE SERPL-SCNC: 96 MMOL/L (ref 98–107)
CO2 SERPL-SCNC: 24 MMOL/L (ref 21–32)
GLUCOSE SERPL-MCNC: 82 MG/DL (ref 74–106)
HCT VFR BLD AUTO: 40.1 % (ref 41–53)
HGB BLD-MCNC: 13.5 G/DL (ref 13.5–17.5)
MCH RBC QN AUTO: 33 PG (ref 28–32)
MCV RBC AUTO: 97.9 FL (ref 80–100)
NRBC BLD QL AUTO: 0.1 %
POTASSIUM SERPL-SCNC: 4.5 MMOL/L (ref 3.5–5.1)
PROT SERPL-MCNC: 7.3 G/DL (ref 6.4–8.2)
SODIUM SERPL-SCNC: 128 MMOL/L (ref 136–145)

## 2017-12-22 PROCEDURE — 80053 COMPREHEN METABOLIC PANEL: CPT

## 2017-12-22 PROCEDURE — 80320 DRUG SCREEN QUANTALCOHOLS: CPT

## 2017-12-22 PROCEDURE — 70450 CT HEAD/BRAIN W/O DYE: CPT

## 2017-12-22 PROCEDURE — 96361 HYDRATE IV INFUSION ADD-ON: CPT

## 2017-12-22 PROCEDURE — 96374 THER/PROPH/DIAG INJ IV PUSH: CPT

## 2017-12-22 PROCEDURE — 36415 COLL VENOUS BLD VENIPUNCTURE: CPT

## 2017-12-22 PROCEDURE — 99285 EMERGENCY DEPT VISIT HI MDM: CPT

## 2017-12-22 PROCEDURE — 81001 URINALYSIS AUTO W/SCOPE: CPT

## 2017-12-22 PROCEDURE — 94761 N-INVAS EAR/PLS OXIMETRY MLT: CPT

## 2017-12-22 PROCEDURE — 85025 COMPLETE CBC W/AUTO DIFF WBC: CPT

## 2018-01-18 ENCOUNTER — HOSPITAL ENCOUNTER (INPATIENT)
Dept: HOSPITAL 15 - ER | Age: 53
LOS: 3 days | Discharge: HOME | DRG: 812 | End: 2018-01-21
Attending: INTERNAL MEDICINE | Admitting: INTERNAL MEDICINE
Payer: MEDICAID

## 2018-01-18 VITALS — WEIGHT: 158.73 LBS | BODY MASS INDEX: 22.72 KG/M2 | HEIGHT: 70 IN

## 2018-01-18 DIAGNOSIS — I10: ICD-10-CM

## 2018-01-18 DIAGNOSIS — F10.239: ICD-10-CM

## 2018-01-18 DIAGNOSIS — G89.29: ICD-10-CM

## 2018-01-18 DIAGNOSIS — T39.311A: Primary | ICD-10-CM

## 2018-01-18 DIAGNOSIS — K21.0: ICD-10-CM

## 2018-01-18 DIAGNOSIS — F32.9: ICD-10-CM

## 2018-01-18 DIAGNOSIS — K86.1: ICD-10-CM

## 2018-01-18 DIAGNOSIS — Z82.49: ICD-10-CM

## 2018-01-18 DIAGNOSIS — F17.210: ICD-10-CM

## 2018-01-18 DIAGNOSIS — Z90.89: ICD-10-CM

## 2018-01-18 DIAGNOSIS — E44.1: ICD-10-CM

## 2018-01-18 DIAGNOSIS — R45.851: ICD-10-CM

## 2018-01-18 DIAGNOSIS — K22.9: ICD-10-CM

## 2018-01-18 DIAGNOSIS — F41.9: ICD-10-CM

## 2018-01-18 DIAGNOSIS — G92: ICD-10-CM

## 2018-01-18 DIAGNOSIS — E11.65: ICD-10-CM

## 2018-01-18 DIAGNOSIS — R65.10: ICD-10-CM

## 2018-01-18 DIAGNOSIS — J98.11: ICD-10-CM
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
## 2018-01-18 DIAGNOSIS — T42.6X1A: ICD-10-CM

## 2018-01-18 DIAGNOSIS — I48.91: ICD-10-CM

## 2018-01-18 DIAGNOSIS — K92.2: ICD-10-CM

## 2018-01-18 DIAGNOSIS — Z23: ICD-10-CM

## 2018-01-18 DIAGNOSIS — Z79.899: ICD-10-CM

## 2018-01-18 DIAGNOSIS — Z83.3: ICD-10-CM

## 2018-01-18 DIAGNOSIS — E87.1: ICD-10-CM

## 2018-01-18 DIAGNOSIS — Z79.4: ICD-10-CM

## 2018-01-18 LAB
ALBUMIN SERPL-MCNC: 3.3 G/DL (ref 3.4–5)
ALCOHOL, URINE: 76 MG/DL (ref 0–5)
ALP SERPL-CCNC: 103 U/L (ref 45–117)
ALT SERPL-CCNC: 20 U/L (ref 16–61)
AMPHETAMINES UR QL SCN: NEGATIVE
AMYLASE SERPL-CCNC: 57 U/L (ref 25–115)
ANION GAP SERPL CALCULATED.3IONS-SCNC: 12 MMOL/L (ref 5–15)
APAP SERPL-MCNC: < 2 UG/ML (ref 10–30)
APTT PPP: 33.3 SEC (ref 22.64–33.71)
BARBITURATES UR QL SCN: NEGATIVE
BENZODIAZ UR QL SCN: NEGATIVE
BILIRUB SERPL-MCNC: 0.5 MG/DL (ref 0.2–1)
BUN SERPL-MCNC: 27 MG/DL (ref 7–18)
BUN/CREAT SERPL: 22.5
BZE UR QL SCN: NEGATIVE
CALCIUM SERPL-MCNC: 8.5 MG/DL (ref 8.5–10.1)
CANNABINOIDS UR QL SCN: NEGATIVE
CHLORIDE SERPL-SCNC: 89 MMOL/L (ref 98–107)
CO2 SERPL-SCNC: 23 MMOL/L (ref 21–32)
GLUCOSE SERPL-MCNC: 196 MG/DL (ref 74–106)
HCT VFR BLD AUTO: 41.6 % (ref 41–53)
HGB BLD-MCNC: 14.3 G/DL (ref 13.5–17.5)
INR PPP: 1.1 (ref 0.9–1.15)
LIPASE SERPL-CCNC: 40 U/L (ref 73–393)
MAGNESIUM SERPL-MCNC: 2.2 MG/DL (ref 1.6–2.6)
MCH RBC QN AUTO: 32.5 PG (ref 28–32)
MCV RBC AUTO: 94.2 FL (ref 80–100)
NRBC BLD QL AUTO: 0 %
OPIATES UR QL SCN: NEGATIVE
PCP UR QL SCN: NEGATIVE
POTASSIUM SERPL-SCNC: 4.1 MMOL/L (ref 3.5–5.1)
PROT SERPL-MCNC: 7.7 G/DL (ref 6.4–8.2)
PROTHROMBIN TIME: 12 SEC (ref 9.37–12.3)
SALICYLATES SERPL-MCNC: < 1.7 MG/DL (ref 2.8–20)
SODIUM SERPL-SCNC: 124 MMOL/L (ref 136–145)

## 2018-01-18 PROCEDURE — 74176 CT ABD & PELVIS W/O CONTRAST: CPT

## 2018-01-18 PROCEDURE — 81001 URINALYSIS AUTO W/SCOPE: CPT

## 2018-01-18 PROCEDURE — 96361 HYDRATE IV INFUSION ADD-ON: CPT

## 2018-01-18 PROCEDURE — 83036 HEMOGLOBIN GLYCOSYLATED A1C: CPT

## 2018-01-18 PROCEDURE — 93306 TTE W/DOPPLER COMPLETE: CPT

## 2018-01-18 PROCEDURE — 82150 ASSAY OF AMYLASE: CPT

## 2018-01-18 PROCEDURE — 85025 COMPLETE CBC W/AUTO DIFF WBC: CPT

## 2018-01-18 PROCEDURE — 85730 THROMBOPLASTIN TIME PARTIAL: CPT

## 2018-01-18 PROCEDURE — 80320 DRUG SCREEN QUANTALCOHOLS: CPT

## 2018-01-18 PROCEDURE — 82962 GLUCOSE BLOOD TEST: CPT

## 2018-01-18 PROCEDURE — 84484 ASSAY OF TROPONIN QUANT: CPT

## 2018-01-18 PROCEDURE — 83735 ASSAY OF MAGNESIUM: CPT

## 2018-01-18 PROCEDURE — 36415 COLL VENOUS BLD VENIPUNCTURE: CPT

## 2018-01-18 PROCEDURE — 85610 PROTHROMBIN TIME: CPT

## 2018-01-18 PROCEDURE — 87081 CULTURE SCREEN ONLY: CPT

## 2018-01-18 PROCEDURE — 85018 HEMOGLOBIN: CPT

## 2018-01-18 PROCEDURE — 80061 LIPID PANEL: CPT

## 2018-01-18 PROCEDURE — 96365 THER/PROPH/DIAG IV INF INIT: CPT

## 2018-01-18 PROCEDURE — 85014 HEMATOCRIT: CPT

## 2018-01-18 PROCEDURE — 83690 ASSAY OF LIPASE: CPT

## 2018-01-18 PROCEDURE — 80053 COMPREHEN METABOLIC PANEL: CPT

## 2018-01-18 PROCEDURE — 80307 DRUG TEST PRSMV CHEM ANLYZR: CPT

## 2018-01-18 PROCEDURE — 80329 ANALGESICS NON-OPIOID 1 OR 2: CPT

## 2018-01-18 PROCEDURE — 96375 TX/PRO/DX INJ NEW DRUG ADDON: CPT

## 2018-01-18 PROCEDURE — 93005 ELECTROCARDIOGRAM TRACING: CPT

## 2018-01-18 PROCEDURE — 85045 AUTOMATED RETICULOCYTE COUNT: CPT

## 2018-01-18 RX ADMIN — HUMAN INSULIN SCH UNITS: 100 INJECTION, SOLUTION SUBCUTANEOUS at 17:57

## 2018-01-18 RX ADMIN — Medication SCH STRIP: at 17:57

## 2018-01-18 RX ADMIN — SODIUM CHLORIDE PRN MG: 9 INJECTION, SOLUTION INTRAVENOUS at 18:05

## 2018-01-18 RX ADMIN — SODIUM CHLORIDE SCH MLS/HR: 0.9 INJECTION, SOLUTION INTRAVENOUS at 18:44

## 2018-01-19 VITALS — SYSTOLIC BLOOD PRESSURE: 106 MMHG | DIASTOLIC BLOOD PRESSURE: 77 MMHG

## 2018-01-19 VITALS — DIASTOLIC BLOOD PRESSURE: 96 MMHG | SYSTOLIC BLOOD PRESSURE: 137 MMHG

## 2018-01-19 VITALS — DIASTOLIC BLOOD PRESSURE: 93 MMHG | SYSTOLIC BLOOD PRESSURE: 148 MMHG

## 2018-01-19 VITALS — SYSTOLIC BLOOD PRESSURE: 152 MMHG | DIASTOLIC BLOOD PRESSURE: 95 MMHG

## 2018-01-19 VITALS — DIASTOLIC BLOOD PRESSURE: 84 MMHG | SYSTOLIC BLOOD PRESSURE: 126 MMHG

## 2018-01-19 LAB
ALBUMIN SERPL-MCNC: 2.8 G/DL (ref 3.4–5)
ALP SERPL-CCNC: 88 U/L (ref 45–117)
ALT SERPL-CCNC: 14 U/L (ref 16–61)
ANION GAP SERPL CALCULATED.3IONS-SCNC: 9 MMOL/L (ref 5–15)
BILIRUB SERPL-MCNC: 0.8 MG/DL (ref 0.2–1)
BUN SERPL-MCNC: 21 MG/DL (ref 7–18)
BUN/CREAT SERPL: 23.9
CALCIUM SERPL-MCNC: 8.3 MG/DL (ref 8.5–10.1)
CHLORIDE SERPL-SCNC: 103 MMOL/L (ref 98–107)
CHOLEST SERPL-MCNC: 80 MG/DL (ref ?–200)
CO2 SERPL-SCNC: 22 MMOL/L (ref 21–32)
GLUCOSE SERPL-MCNC: 108 MG/DL (ref 74–106)
HCT VFR BLD AUTO: 39.9 % (ref 41–53)
HCT VFR BLD AUTO: 40.1 % (ref 41–53)
HCT VFR BLD AUTO: 46 % (ref 41–53)
HDLC SERPL-MCNC: 46 MG/DL (ref 40–59)
HGB BLD-MCNC: 13.9 G/DL (ref 13.5–17.5)
HGB BLD-MCNC: 14 G/DL (ref 13.5–17.5)
HGB BLD-MCNC: 15.6 G/DL (ref 13.5–17.5)
POTASSIUM SERPL-SCNC: 4.3 MMOL/L (ref 3.5–5.1)
PROT SERPL-MCNC: 6.8 G/DL (ref 6.4–8.2)
SODIUM SERPL-SCNC: 134 MMOL/L (ref 136–145)
TRIGL SERPL-MCNC: 68 MG/DL (ref ?–150)

## 2018-01-19 RX ADMIN — Medication SCH STRIP: at 12:07

## 2018-01-19 RX ADMIN — HUMAN INSULIN SCH UNITS: 100 INJECTION, SOLUTION SUBCUTANEOUS at 23:43

## 2018-01-19 RX ADMIN — SODIUM CHLORIDE SCH MLS/HR: 0.9 INJECTION, SOLUTION INTRAVENOUS at 10:35

## 2018-01-19 RX ADMIN — HUMAN INSULIN SCH UNITS: 100 INJECTION, SOLUTION SUBCUTANEOUS at 05:51

## 2018-01-19 RX ADMIN — SODIUM CHLORIDE PRN MG: 9 INJECTION, SOLUTION INTRAVENOUS at 10:34

## 2018-01-19 RX ADMIN — Medication SCH GM: at 12:07

## 2018-01-19 RX ADMIN — Medication SCH STRIP: at 23:43

## 2018-01-19 RX ADMIN — HUMAN INSULIN SCH UNITS: 100 INJECTION, SOLUTION SUBCUTANEOUS at 12:00

## 2018-01-19 RX ADMIN — SODIUM CHLORIDE PRN MG: 9 INJECTION, SOLUTION INTRAVENOUS at 05:53

## 2018-01-19 RX ADMIN — Medication SCH STRIP: at 17:33

## 2018-01-19 RX ADMIN — HUMAN INSULIN SCH UNITS: 100 INJECTION, SOLUTION SUBCUTANEOUS at 00:00

## 2018-01-19 RX ADMIN — Medication SCH STRIP: at 05:51

## 2018-01-19 RX ADMIN — PANTOPRAZOLE SODIUM SCH MG: 40 TABLET, DELAYED RELEASE ORAL at 21:43

## 2018-01-19 RX ADMIN — Medication SCH STRIP: at 00:00

## 2018-01-19 RX ADMIN — PANTOPRAZOLE SODIUM SCH MG: 40 TABLET, DELAYED RELEASE ORAL at 10:35

## 2018-01-19 RX ADMIN — SODIUM CHLORIDE SCH MLS/HR: 0.9 INJECTION, SOLUTION INTRAVENOUS at 02:05

## 2018-01-19 RX ADMIN — Medication SCH GM: at 17:33

## 2018-01-19 RX ADMIN — SODIUM CHLORIDE PRN MG: 9 INJECTION, SOLUTION INTRAVENOUS at 01:19

## 2018-01-19 RX ADMIN — HUMAN INSULIN SCH UNITS: 100 INJECTION, SOLUTION SUBCUTANEOUS at 17:33

## 2018-01-19 RX ADMIN — Medication SCH GM: at 21:43

## 2018-01-19 RX ADMIN — SODIUM CHLORIDE PRN MG: 9 INJECTION, SOLUTION INTRAVENOUS at 22:14

## 2018-01-19 RX ADMIN — SODIUM CHLORIDE PRN MG: 9 INJECTION, SOLUTION INTRAVENOUS at 17:50

## 2018-01-20 VITALS — SYSTOLIC BLOOD PRESSURE: 133 MMHG | DIASTOLIC BLOOD PRESSURE: 96 MMHG

## 2018-01-20 VITALS — DIASTOLIC BLOOD PRESSURE: 89 MMHG | SYSTOLIC BLOOD PRESSURE: 143 MMHG

## 2018-01-20 VITALS — SYSTOLIC BLOOD PRESSURE: 135 MMHG | DIASTOLIC BLOOD PRESSURE: 77 MMHG

## 2018-01-20 VITALS — DIASTOLIC BLOOD PRESSURE: 82 MMHG | SYSTOLIC BLOOD PRESSURE: 136 MMHG

## 2018-01-20 VITALS — DIASTOLIC BLOOD PRESSURE: 79 MMHG | SYSTOLIC BLOOD PRESSURE: 136 MMHG

## 2018-01-20 RX ADMIN — PANTOPRAZOLE SODIUM SCH MG: 40 TABLET, DELAYED RELEASE ORAL at 10:52

## 2018-01-20 RX ADMIN — Medication SCH STRIP: at 05:59

## 2018-01-20 RX ADMIN — ASPIRIN SCH MG: 81 TABLET ORAL at 10:00

## 2018-01-20 RX ADMIN — SODIUM CHLORIDE PRN MG: 9 INJECTION, SOLUTION INTRAVENOUS at 10:51

## 2018-01-20 RX ADMIN — HUMAN INSULIN SCH UNITS: 100 INJECTION, SOLUTION SUBCUTANEOUS at 06:00

## 2018-01-20 RX ADMIN — Medication SCH GM: at 10:52

## 2018-01-20 RX ADMIN — SODIUM CHLORIDE PRN MG: 9 INJECTION, SOLUTION INTRAVENOUS at 06:33

## 2018-01-20 RX ADMIN — Medication SCH STRIP: at 12:27

## 2018-01-20 RX ADMIN — Medication SCH GM: at 05:59

## 2018-01-20 RX ADMIN — SODIUM CHLORIDE PRN MG: 9 INJECTION, SOLUTION INTRAVENOUS at 15:47

## 2018-01-20 RX ADMIN — SODIUM CHLORIDE PRN MG: 9 INJECTION, SOLUTION INTRAVENOUS at 02:19

## 2018-01-20 RX ADMIN — Medication SCH GM: at 17:30

## 2018-01-20 RX ADMIN — SODIUM CHLORIDE PRN MG: 9 INJECTION, SOLUTION INTRAVENOUS at 20:26

## 2018-01-20 RX ADMIN — HUMAN INSULIN SCH UNITS: 100 INJECTION, SOLUTION SUBCUTANEOUS at 12:27

## 2018-01-20 RX ADMIN — PANTOPRAZOLE SODIUM SCH MG: 40 TABLET, DELAYED RELEASE ORAL at 22:00

## 2018-01-20 RX ADMIN — HUMAN INSULIN SCH UNITS: 100 INJECTION, SOLUTION SUBCUTANEOUS at 17:30

## 2018-01-20 RX ADMIN — Medication SCH GM: at 21:59

## 2018-01-20 RX ADMIN — Medication SCH STRIP: at 17:30

## 2018-01-21 VITALS — DIASTOLIC BLOOD PRESSURE: 84 MMHG | SYSTOLIC BLOOD PRESSURE: 133 MMHG

## 2018-01-21 VITALS — DIASTOLIC BLOOD PRESSURE: 79 MMHG | SYSTOLIC BLOOD PRESSURE: 133 MMHG

## 2018-01-21 VITALS — SYSTOLIC BLOOD PRESSURE: 133 MMHG | DIASTOLIC BLOOD PRESSURE: 84 MMHG

## 2018-01-21 RX ADMIN — PANTOPRAZOLE SODIUM SCH MG: 40 TABLET, DELAYED RELEASE ORAL at 09:50

## 2018-01-21 RX ADMIN — SODIUM CHLORIDE PRN MG: 9 INJECTION, SOLUTION INTRAVENOUS at 04:35

## 2018-01-21 RX ADMIN — SODIUM CHLORIDE PRN MG: 9 INJECTION, SOLUTION INTRAVENOUS at 08:36

## 2018-01-21 RX ADMIN — SODIUM CHLORIDE PRN MG: 9 INJECTION, SOLUTION INTRAVENOUS at 00:29

## 2018-01-21 RX ADMIN — Medication SCH STRIP: at 00:00

## 2018-01-21 RX ADMIN — HUMAN INSULIN SCH UNITS: 100 INJECTION, SOLUTION SUBCUTANEOUS at 00:28

## 2018-01-21 RX ADMIN — HUMAN INSULIN SCH UNITS: 100 INJECTION, SOLUTION SUBCUTANEOUS at 06:00

## 2018-01-21 RX ADMIN — Medication SCH GM: at 05:56

## 2018-01-21 RX ADMIN — ASPIRIN SCH MG: 81 TABLET ORAL at 09:50

## 2018-01-21 RX ADMIN — Medication SCH GM: at 11:30

## 2018-01-21 RX ADMIN — Medication SCH STRIP: at 05:56

## 2018-05-25 ENCOUNTER — HOSPITAL ENCOUNTER (EMERGENCY)
Dept: HOSPITAL 15 - ER | Age: 53
LOS: 1 days | Discharge: HOME | End: 2018-05-26
Payer: MEDICAID

## 2018-05-25 VITALS — HEIGHT: 68 IN | BODY MASS INDEX: 27.28 KG/M2 | WEIGHT: 180 LBS

## 2018-05-25 DIAGNOSIS — G92: ICD-10-CM

## 2018-05-25 DIAGNOSIS — F17.210: ICD-10-CM

## 2018-05-25 DIAGNOSIS — R56.9: Primary | ICD-10-CM

## 2018-05-25 DIAGNOSIS — Z79.4: ICD-10-CM

## 2018-05-25 DIAGNOSIS — F10.129: ICD-10-CM

## 2018-05-25 DIAGNOSIS — K21.9: ICD-10-CM

## 2018-05-25 DIAGNOSIS — Z79.82: ICD-10-CM

## 2018-05-25 DIAGNOSIS — E11.9: ICD-10-CM

## 2018-05-25 DIAGNOSIS — I10: ICD-10-CM

## 2018-05-25 DIAGNOSIS — R42: ICD-10-CM

## 2018-05-25 LAB
ALBUMIN SERPL-MCNC: 3.9 G/DL (ref 3.4–5)
ALCOHOL, URINE: 257 MG/DL (ref 0–5)
ALP SERPL-CCNC: 103 U/L (ref 45–117)
ALT SERPL-CCNC: 52 U/L (ref 16–61)
AMPHETAMINES UR QL SCN: NEGATIVE
ANION GAP SERPL CALCULATED.3IONS-SCNC: 12 MMOL/L (ref 5–15)
BARBITURATES UR QL SCN: NEGATIVE
BENZODIAZ UR QL SCN: NEGATIVE
BILIRUB SERPL-MCNC: 0.2 MG/DL (ref 0.2–1)
BUN SERPL-MCNC: 8 MG/DL (ref 7–18)
BUN/CREAT SERPL: 10.1
BZE UR QL SCN: NEGATIVE
CALCIUM SERPL-MCNC: 8.9 MG/DL (ref 8.5–10.1)
CANNABINOIDS UR QL SCN: NEGATIVE
CHLORIDE SERPL-SCNC: 100 MMOL/L (ref 98–107)
CO2 SERPL-SCNC: 22 MMOL/L (ref 21–32)
GLUCOSE SERPL-MCNC: 135 MG/DL (ref 74–106)
HCT VFR BLD AUTO: 42.9 % (ref 41–53)
HGB BLD-MCNC: 14.7 G/DL (ref 13.5–17.5)
MAGNESIUM SERPL-MCNC: 1.9 MG/DL (ref 1.6–2.6)
MCH RBC QN AUTO: 34.4 PG (ref 28–32)
MCV RBC AUTO: 100.4 FL (ref 80–100)
NRBC BLD QL AUTO: 0 %
OPIATES UR QL SCN: NEGATIVE
PCP UR QL SCN: NEGATIVE
POTASSIUM SERPL-SCNC: 3.5 MMOL/L (ref 3.5–5.1)
PROT SERPL-MCNC: 8 G/DL (ref 6.4–8.2)
SODIUM SERPL-SCNC: 134 MMOL/L (ref 136–145)

## 2018-05-25 PROCEDURE — 84484 ASSAY OF TROPONIN QUANT: CPT

## 2018-05-25 PROCEDURE — 70450 CT HEAD/BRAIN W/O DYE: CPT

## 2018-05-25 PROCEDURE — 80053 COMPREHEN METABOLIC PANEL: CPT

## 2018-05-25 PROCEDURE — 85025 COMPLETE CBC W/AUTO DIFF WBC: CPT

## 2018-05-25 PROCEDURE — 82962 GLUCOSE BLOOD TEST: CPT

## 2018-05-25 PROCEDURE — 80307 DRUG TEST PRSMV CHEM ANLYZR: CPT

## 2018-05-25 PROCEDURE — 83735 ASSAY OF MAGNESIUM: CPT

## 2018-05-25 PROCEDURE — 96365 THER/PROPH/DIAG IV INF INIT: CPT

## 2018-05-25 PROCEDURE — 96366 THER/PROPH/DIAG IV INF ADDON: CPT

## 2018-05-25 PROCEDURE — 93005 ELECTROCARDIOGRAM TRACING: CPT

## 2018-05-25 PROCEDURE — 36415 COLL VENOUS BLD VENIPUNCTURE: CPT

## 2018-05-25 PROCEDURE — 99285 EMERGENCY DEPT VISIT HI MDM: CPT

## 2018-05-25 PROCEDURE — 81001 URINALYSIS AUTO W/SCOPE: CPT

## 2018-05-26 VITALS — SYSTOLIC BLOOD PRESSURE: 145 MMHG | DIASTOLIC BLOOD PRESSURE: 72 MMHG

## 2018-06-18 ENCOUNTER — HOSPITAL ENCOUNTER (INPATIENT)
Dept: HOSPITAL 15 - ER | Age: 53
LOS: 4 days | Discharge: HOME | End: 2018-06-22
Attending: INTERNAL MEDICINE | Admitting: NURSE PRACTITIONER
Payer: MEDICAID

## 2018-06-18 VITALS — DIASTOLIC BLOOD PRESSURE: 82 MMHG | SYSTOLIC BLOOD PRESSURE: 152 MMHG

## 2018-06-18 VITALS — BODY MASS INDEX: 26.39 KG/M2 | HEIGHT: 70 IN | WEIGHT: 184.31 LBS

## 2018-06-18 VITALS — DIASTOLIC BLOOD PRESSURE: 82 MMHG | SYSTOLIC BLOOD PRESSURE: 154 MMHG

## 2018-06-18 DIAGNOSIS — I10: ICD-10-CM

## 2018-06-18 DIAGNOSIS — G93.41: ICD-10-CM

## 2018-06-18 DIAGNOSIS — Z80.3: ICD-10-CM

## 2018-06-18 DIAGNOSIS — E11.42: ICD-10-CM

## 2018-06-18 DIAGNOSIS — E11.65: ICD-10-CM

## 2018-06-18 DIAGNOSIS — E44.0: ICD-10-CM

## 2018-06-18 DIAGNOSIS — Z79.899: ICD-10-CM

## 2018-06-18 DIAGNOSIS — G40.409: ICD-10-CM

## 2018-06-18 DIAGNOSIS — Z80.8: ICD-10-CM

## 2018-06-18 DIAGNOSIS — Z83.3: ICD-10-CM

## 2018-06-18 DIAGNOSIS — Z82.3: ICD-10-CM

## 2018-06-18 DIAGNOSIS — Z80.1: ICD-10-CM

## 2018-06-18 DIAGNOSIS — Z86.73: ICD-10-CM

## 2018-06-18 DIAGNOSIS — K21.9: ICD-10-CM

## 2018-06-18 DIAGNOSIS — E87.5: ICD-10-CM

## 2018-06-18 DIAGNOSIS — Z82.49: ICD-10-CM

## 2018-06-18 DIAGNOSIS — Z80.42: ICD-10-CM

## 2018-06-18 DIAGNOSIS — F32.9: ICD-10-CM

## 2018-06-18 DIAGNOSIS — Z82.0: ICD-10-CM

## 2018-06-18 DIAGNOSIS — Z79.4: ICD-10-CM

## 2018-06-18 DIAGNOSIS — G62.1: ICD-10-CM

## 2018-06-18 DIAGNOSIS — F10.239: Primary | ICD-10-CM

## 2018-06-18 DIAGNOSIS — F17.210: ICD-10-CM

## 2018-06-18 DIAGNOSIS — E87.1: ICD-10-CM

## 2018-06-18 LAB
ALBUMIN SERPL-MCNC: 3.2 G/DL (ref 3.4–5)
ALCOHOL, URINE: < 3 MG/DL (ref 0–5)
ALP SERPL-CCNC: 83 U/L (ref 45–117)
ALT SERPL-CCNC: 65 U/L (ref 16–61)
AMPHETAMINES UR QL SCN: NEGATIVE
ANION GAP SERPL CALCULATED.3IONS-SCNC: 12 MMOL/L (ref 5–15)
BARBITURATES UR QL SCN: NEGATIVE
BENZODIAZ UR QL SCN: NEGATIVE
BILIRUB SERPL-MCNC: 0.3 MG/DL (ref 0.2–1)
BUN SERPL-MCNC: 10 MG/DL (ref 7–18)
BUN/CREAT SERPL: 9.1
BZE UR QL SCN: NEGATIVE
CALCIUM SERPL-MCNC: 8.4 MG/DL (ref 8.5–10.1)
CANNABINOIDS UR QL SCN: NEGATIVE
CHLORIDE SERPL-SCNC: 94 MMOL/L (ref 98–107)
CO2 SERPL-SCNC: 25 MMOL/L (ref 21–32)
GLUCOSE SERPL-MCNC: 283 MG/DL (ref 74–106)
HCT VFR BLD AUTO: 36.5 % (ref 41–53)
HGB BLD-MCNC: 12.6 G/DL (ref 13.5–17.5)
INR PPP: 0.95 (ref 0.9–1.15)
MCH RBC QN AUTO: 35 PG (ref 28–32)
MCV RBC AUTO: 101.1 FL (ref 80–100)
NRBC BLD QL AUTO: 0.1 %
OPIATES UR QL SCN: NEGATIVE
PCP UR QL SCN: NEGATIVE
POTASSIUM SERPL-SCNC: 4 MMOL/L (ref 3.5–5.1)
PROT SERPL-MCNC: 7.1 G/DL (ref 6.4–8.2)
PROTHROMBIN TIME: 10.2 SEC (ref 9.27–12.13)
SODIUM SERPL-SCNC: 131 MMOL/L (ref 136–145)

## 2018-06-18 PROCEDURE — 80320 DRUG SCREEN QUANTALCOHOLS: CPT

## 2018-06-18 PROCEDURE — 96365 THER/PROPH/DIAG IV INF INIT: CPT

## 2018-06-18 PROCEDURE — 85610 PROTHROMBIN TIME: CPT

## 2018-06-18 PROCEDURE — 81001 URINALYSIS AUTO W/SCOPE: CPT

## 2018-06-18 PROCEDURE — 82962 GLUCOSE BLOOD TEST: CPT

## 2018-06-18 PROCEDURE — 96375 TX/PRO/DX INJ NEW DRUG ADDON: CPT

## 2018-06-18 PROCEDURE — 83036 HEMOGLOBIN GLYCOSYLATED A1C: CPT

## 2018-06-18 PROCEDURE — 96361 HYDRATE IV INFUSION ADD-ON: CPT

## 2018-06-18 PROCEDURE — 80053 COMPREHEN METABOLIC PANEL: CPT

## 2018-06-18 PROCEDURE — 95819 EEG AWAKE AND ASLEEP: CPT

## 2018-06-18 PROCEDURE — 97163 PT EVAL HIGH COMPLEX 45 MIN: CPT

## 2018-06-18 PROCEDURE — 36415 COLL VENOUS BLD VENIPUNCTURE: CPT

## 2018-06-18 PROCEDURE — 80307 DRUG TEST PRSMV CHEM ANLYZR: CPT

## 2018-06-18 PROCEDURE — 85025 COMPLETE CBC W/AUTO DIFF WBC: CPT

## 2018-06-18 RX ADMIN — AMITRIPTYLINE HYDROCHLORIDE SCH MG: 25 TABLET, FILM COATED ORAL at 22:00

## 2018-06-18 RX ADMIN — Medication SCH GM: at 22:00

## 2018-06-18 RX ADMIN — SODIUM CHLORIDE SCH MLS/HR: 9 INJECTION, SOLUTION INTRAVENOUS at 21:30

## 2018-06-18 RX ADMIN — PANTOPRAZOLE SODIUM SCH MG: 40 TABLET, DELAYED RELEASE ORAL at 22:00

## 2018-06-18 RX ADMIN — HUMAN INSULIN SCH UNITS: 100 INJECTION, SOLUTION SUBCUTANEOUS at 23:45

## 2018-06-18 RX ADMIN — GABAPENTIN SCH MG: 400 CAPSULE ORAL at 22:00

## 2018-06-18 RX ADMIN — Medication SCH STRIP: at 23:45

## 2018-06-18 RX ADMIN — HYDROCODONE BITARTRATE AND ACETAMINOPHEN PRN TAB: 5; 325 TABLET ORAL at 23:46

## 2018-06-19 VITALS — DIASTOLIC BLOOD PRESSURE: 83 MMHG | SYSTOLIC BLOOD PRESSURE: 139 MMHG

## 2018-06-19 VITALS — SYSTOLIC BLOOD PRESSURE: 164 MMHG | DIASTOLIC BLOOD PRESSURE: 104 MMHG

## 2018-06-19 VITALS — DIASTOLIC BLOOD PRESSURE: 99 MMHG | SYSTOLIC BLOOD PRESSURE: 160 MMHG

## 2018-06-19 VITALS — SYSTOLIC BLOOD PRESSURE: 111 MMHG | DIASTOLIC BLOOD PRESSURE: 78 MMHG

## 2018-06-19 VITALS — SYSTOLIC BLOOD PRESSURE: 145 MMHG | DIASTOLIC BLOOD PRESSURE: 89 MMHG

## 2018-06-19 VITALS — DIASTOLIC BLOOD PRESSURE: 85 MMHG | SYSTOLIC BLOOD PRESSURE: 127 MMHG

## 2018-06-19 LAB
ALBUMIN SERPL-MCNC: 2.9 G/DL (ref 3.4–5)
ALP SERPL-CCNC: 74 U/L (ref 45–117)
ALT SERPL-CCNC: 52 U/L (ref 16–61)
ANION GAP SERPL CALCULATED.3IONS-SCNC: 8 MMOL/L (ref 5–15)
BILIRUB SERPL-MCNC: 0.3 MG/DL (ref 0.2–1)
BUN SERPL-MCNC: 7 MG/DL (ref 7–18)
BUN/CREAT SERPL: 9.3
CALCIUM SERPL-MCNC: 8.4 MG/DL (ref 8.5–10.1)
CHLORIDE SERPL-SCNC: 103 MMOL/L (ref 98–107)
CO2 SERPL-SCNC: 26 MMOL/L (ref 21–32)
GLUCOSE SERPL-MCNC: 205 MG/DL (ref 74–106)
HCT VFR BLD AUTO: 34.8 % (ref 41–53)
HGB BLD-MCNC: 12 G/DL (ref 13.5–17.5)
MCH RBC QN AUTO: 34.8 PG (ref 28–32)
MCV RBC AUTO: 101 FL (ref 80–100)
NRBC BLD QL AUTO: 0 %
POTASSIUM SERPL-SCNC: 3.6 MMOL/L (ref 3.5–5.1)
PROT SERPL-MCNC: 6.3 G/DL (ref 6.4–8.2)
SODIUM SERPL-SCNC: 137 MMOL/L (ref 136–145)

## 2018-06-19 RX ADMIN — SODIUM CHLORIDE SCH MLS/HR: 9 INJECTION, SOLUTION INTRAVENOUS at 12:20

## 2018-06-19 RX ADMIN — HUMAN INSULIN SCH UNITS: 100 INJECTION, SOLUTION SUBCUTANEOUS at 05:49

## 2018-06-19 RX ADMIN — Medication SCH GM: at 21:13

## 2018-06-19 RX ADMIN — GABAPENTIN SCH MG: 400 CAPSULE ORAL at 15:39

## 2018-06-19 RX ADMIN — Medication SCH STRIP: at 17:39

## 2018-06-19 RX ADMIN — Medication SCH STRIP: at 05:49

## 2018-06-19 RX ADMIN — GABAPENTIN SCH MG: 400 CAPSULE ORAL at 06:23

## 2018-06-19 RX ADMIN — Medication SCH GM: at 06:24

## 2018-06-19 RX ADMIN — AMITRIPTYLINE HYDROCHLORIDE SCH MG: 25 TABLET, FILM COATED ORAL at 21:13

## 2018-06-19 RX ADMIN — HUMAN INSULIN SCH UNITS: 100 INJECTION, SOLUTION SUBCUTANEOUS at 17:39

## 2018-06-19 RX ADMIN — Medication SCH GM: at 11:56

## 2018-06-19 RX ADMIN — Medication SCH GM: at 17:10

## 2018-06-19 RX ADMIN — PANTOPRAZOLE SODIUM SCH MG: 40 TABLET, DELAYED RELEASE ORAL at 09:48

## 2018-06-19 RX ADMIN — Medication SCH STRIP: at 12:06

## 2018-06-19 RX ADMIN — PANTOPRAZOLE SODIUM SCH MG: 40 TABLET, DELAYED RELEASE ORAL at 21:13

## 2018-06-19 RX ADMIN — HUMAN INSULIN SCH UNITS: 100 INJECTION, SOLUTION SUBCUTANEOUS at 12:06

## 2018-06-19 RX ADMIN — HYDROCODONE BITARTRATE AND ACETAMINOPHEN PRN TAB: 5; 325 TABLET ORAL at 15:36

## 2018-06-19 RX ADMIN — HYDROCODONE BITARTRATE AND ACETAMINOPHEN PRN TAB: 5; 325 TABLET ORAL at 04:06

## 2018-06-19 RX ADMIN — ENOXAPARIN SODIUM SCH MG: 40 INJECTION SUBCUTANEOUS at 09:48

## 2018-06-19 RX ADMIN — HYDROCODONE BITARTRATE AND ACETAMINOPHEN PRN TAB: 5; 325 TABLET ORAL at 20:32

## 2018-06-19 RX ADMIN — HYDROCODONE BITARTRATE AND ACETAMINOPHEN PRN TAB: 5; 325 TABLET ORAL at 09:52

## 2018-06-19 RX ADMIN — GABAPENTIN SCH MG: 400 CAPSULE ORAL at 22:04

## 2018-06-20 VITALS — SYSTOLIC BLOOD PRESSURE: 141 MMHG | DIASTOLIC BLOOD PRESSURE: 86 MMHG

## 2018-06-20 VITALS — DIASTOLIC BLOOD PRESSURE: 91 MMHG | SYSTOLIC BLOOD PRESSURE: 150 MMHG

## 2018-06-20 VITALS — SYSTOLIC BLOOD PRESSURE: 167 MMHG | DIASTOLIC BLOOD PRESSURE: 91 MMHG

## 2018-06-20 VITALS — DIASTOLIC BLOOD PRESSURE: 90 MMHG | SYSTOLIC BLOOD PRESSURE: 149 MMHG

## 2018-06-20 VITALS — DIASTOLIC BLOOD PRESSURE: 90 MMHG | SYSTOLIC BLOOD PRESSURE: 146 MMHG

## 2018-06-20 RX ADMIN — Medication SCH GM: at 06:03

## 2018-06-20 RX ADMIN — HUMAN INSULIN SCH UNITS: 100 INJECTION, SOLUTION SUBCUTANEOUS at 11:55

## 2018-06-20 RX ADMIN — HUMAN INSULIN SCH UNITS: 100 INJECTION, SOLUTION SUBCUTANEOUS at 23:45

## 2018-06-20 RX ADMIN — Medication SCH GM: at 21:58

## 2018-06-20 RX ADMIN — HYDROCODONE BITARTRATE AND ACETAMINOPHEN PRN TAB: 5; 325 TABLET ORAL at 21:59

## 2018-06-20 RX ADMIN — ENOXAPARIN SODIUM SCH MG: 40 INJECTION SUBCUTANEOUS at 09:38

## 2018-06-20 RX ADMIN — Medication SCH STRIP: at 17:48

## 2018-06-20 RX ADMIN — GABAPENTIN SCH MG: 400 CAPSULE ORAL at 21:59

## 2018-06-20 RX ADMIN — Medication SCH GM: at 16:41

## 2018-06-20 RX ADMIN — LISINOPRIL SCH MG: 5 TABLET ORAL at 09:39

## 2018-06-20 RX ADMIN — PANTOPRAZOLE SODIUM SCH MG: 40 TABLET, DELAYED RELEASE ORAL at 21:59

## 2018-06-20 RX ADMIN — GABAPENTIN SCH MG: 400 CAPSULE ORAL at 13:38

## 2018-06-20 RX ADMIN — SODIUM CHLORIDE SCH MLS/HR: 9 INJECTION, SOLUTION INTRAVENOUS at 12:11

## 2018-06-20 RX ADMIN — HYDROCODONE BITARTRATE AND ACETAMINOPHEN PRN TAB: 5; 325 TABLET ORAL at 04:51

## 2018-06-20 RX ADMIN — Medication SCH GM: at 11:15

## 2018-06-20 RX ADMIN — Medication SCH STRIP: at 06:04

## 2018-06-20 RX ADMIN — HUMAN INSULIN SCH UNITS: 100 INJECTION, SOLUTION SUBCUTANEOUS at 00:23

## 2018-06-20 RX ADMIN — HUMAN INSULIN SCH UNITS: 100 INJECTION, SOLUTION SUBCUTANEOUS at 06:04

## 2018-06-20 RX ADMIN — PANTOPRAZOLE SODIUM SCH MG: 40 TABLET, DELAYED RELEASE ORAL at 09:38

## 2018-06-20 RX ADMIN — AMITRIPTYLINE HYDROCHLORIDE SCH MG: 25 TABLET, FILM COATED ORAL at 21:58

## 2018-06-20 RX ADMIN — HYDROCODONE BITARTRATE AND ACETAMINOPHEN PRN TAB: 5; 325 TABLET ORAL at 14:57

## 2018-06-20 RX ADMIN — Medication SCH STRIP: at 11:54

## 2018-06-20 RX ADMIN — Medication SCH STRIP: at 00:23

## 2018-06-20 RX ADMIN — GABAPENTIN SCH MG: 400 CAPSULE ORAL at 06:03

## 2018-06-20 RX ADMIN — HUMAN INSULIN SCH UNITS: 100 INJECTION, SOLUTION SUBCUTANEOUS at 17:49

## 2018-06-20 RX ADMIN — Medication SCH STRIP: at 23:45

## 2018-06-21 VITALS — DIASTOLIC BLOOD PRESSURE: 95 MMHG | SYSTOLIC BLOOD PRESSURE: 164 MMHG

## 2018-06-21 VITALS — DIASTOLIC BLOOD PRESSURE: 98 MMHG | SYSTOLIC BLOOD PRESSURE: 142 MMHG

## 2018-06-21 VITALS — DIASTOLIC BLOOD PRESSURE: 96 MMHG | SYSTOLIC BLOOD PRESSURE: 152 MMHG

## 2018-06-21 VITALS — DIASTOLIC BLOOD PRESSURE: 99 MMHG | SYSTOLIC BLOOD PRESSURE: 156 MMHG

## 2018-06-21 VITALS — SYSTOLIC BLOOD PRESSURE: 132 MMHG | DIASTOLIC BLOOD PRESSURE: 86 MMHG

## 2018-06-21 RX ADMIN — GABAPENTIN SCH MG: 400 CAPSULE ORAL at 14:50

## 2018-06-21 RX ADMIN — Medication SCH STRIP: at 12:00

## 2018-06-21 RX ADMIN — ENOXAPARIN SODIUM SCH MG: 40 INJECTION SUBCUTANEOUS at 10:51

## 2018-06-21 RX ADMIN — PANTOPRAZOLE SODIUM SCH MG: 40 TABLET, DELAYED RELEASE ORAL at 10:50

## 2018-06-21 RX ADMIN — Medication SCH STRIP: at 23:32

## 2018-06-21 RX ADMIN — Medication SCH GM: at 11:06

## 2018-06-21 RX ADMIN — HUMAN INSULIN SCH UNITS: 100 INJECTION, SOLUTION SUBCUTANEOUS at 18:00

## 2018-06-21 RX ADMIN — HUMAN INSULIN SCH UNITS: 100 INJECTION, SOLUTION SUBCUTANEOUS at 23:32

## 2018-06-21 RX ADMIN — Medication SCH GM: at 18:06

## 2018-06-21 RX ADMIN — PANTOPRAZOLE SODIUM SCH MG: 40 TABLET, DELAYED RELEASE ORAL at 21:31

## 2018-06-21 RX ADMIN — GABAPENTIN SCH MG: 400 CAPSULE ORAL at 05:33

## 2018-06-21 RX ADMIN — Medication SCH GM: at 06:00

## 2018-06-21 RX ADMIN — Medication SCH STRIP: at 18:05

## 2018-06-21 RX ADMIN — LISINOPRIL SCH MG: 5 TABLET ORAL at 10:51

## 2018-06-21 RX ADMIN — GABAPENTIN SCH MG: 400 CAPSULE ORAL at 21:31

## 2018-06-21 RX ADMIN — HYDROCODONE BITARTRATE AND ACETAMINOPHEN PRN TAB: 5; 325 TABLET ORAL at 11:07

## 2018-06-21 RX ADMIN — Medication SCH GM: at 21:30

## 2018-06-21 RX ADMIN — HUMAN INSULIN SCH UNITS: 100 INJECTION, SOLUTION SUBCUTANEOUS at 12:00

## 2018-06-21 RX ADMIN — SODIUM CHLORIDE SCH MLS/HR: 9 INJECTION, SOLUTION INTRAVENOUS at 11:05

## 2018-06-21 RX ADMIN — HUMAN INSULIN SCH UNITS: 100 INJECTION, SOLUTION SUBCUTANEOUS at 05:34

## 2018-06-21 RX ADMIN — Medication SCH STRIP: at 05:34

## 2018-06-21 RX ADMIN — AMITRIPTYLINE HYDROCHLORIDE SCH MG: 25 TABLET, FILM COATED ORAL at 21:31

## 2018-06-22 VITALS — SYSTOLIC BLOOD PRESSURE: 142 MMHG | DIASTOLIC BLOOD PRESSURE: 79 MMHG

## 2018-06-22 VITALS — SYSTOLIC BLOOD PRESSURE: 158 MMHG | DIASTOLIC BLOOD PRESSURE: 95 MMHG

## 2018-06-22 VITALS — SYSTOLIC BLOOD PRESSURE: 141 MMHG | DIASTOLIC BLOOD PRESSURE: 94 MMHG

## 2018-06-22 VITALS — DIASTOLIC BLOOD PRESSURE: 83 MMHG | SYSTOLIC BLOOD PRESSURE: 137 MMHG

## 2018-06-22 VITALS — DIASTOLIC BLOOD PRESSURE: 85 MMHG | SYSTOLIC BLOOD PRESSURE: 140 MMHG

## 2018-06-22 RX ADMIN — ENOXAPARIN SODIUM SCH MG: 40 INJECTION SUBCUTANEOUS at 09:29

## 2018-06-22 RX ADMIN — Medication SCH STRIP: at 18:00

## 2018-06-22 RX ADMIN — HUMAN INSULIN SCH UNITS: 100 INJECTION, SOLUTION SUBCUTANEOUS at 12:52

## 2018-06-22 RX ADMIN — GABAPENTIN SCH MG: 400 CAPSULE ORAL at 14:00

## 2018-06-22 RX ADMIN — PANTOPRAZOLE SODIUM SCH MG: 40 TABLET, DELAYED RELEASE ORAL at 09:29

## 2018-06-22 RX ADMIN — LISINOPRIL SCH MG: 5 TABLET ORAL at 09:29

## 2018-06-22 RX ADMIN — Medication SCH GM: at 19:14

## 2018-06-22 RX ADMIN — HUMAN INSULIN SCH UNITS: 100 INJECTION, SOLUTION SUBCUTANEOUS at 19:14

## 2018-06-22 RX ADMIN — HYDROCODONE BITARTRATE AND ACETAMINOPHEN PRN TAB: 5; 325 TABLET ORAL at 12:52

## 2018-06-22 RX ADMIN — Medication SCH STRIP: at 05:46

## 2018-06-22 RX ADMIN — GABAPENTIN SCH MG: 400 CAPSULE ORAL at 05:45

## 2018-06-22 RX ADMIN — HUMAN INSULIN SCH UNITS: 100 INJECTION, SOLUTION SUBCUTANEOUS at 05:46

## 2018-06-22 RX ADMIN — SODIUM CHLORIDE SCH MLS/HR: 9 INJECTION, SOLUTION INTRAVENOUS at 13:56

## 2018-06-22 RX ADMIN — Medication SCH STRIP: at 12:20

## 2018-06-22 RX ADMIN — Medication SCH GM: at 12:51

## 2018-06-22 RX ADMIN — Medication SCH GM: at 06:02

## 2018-07-18 ENCOUNTER — HOSPITAL ENCOUNTER (EMERGENCY)
Dept: HOSPITAL 15 - ER | Age: 53
LOS: 1 days | Discharge: HOME | End: 2018-07-19
Payer: MEDICAID

## 2018-07-18 VITALS — HEIGHT: 70 IN | BODY MASS INDEX: 24.2 KG/M2 | WEIGHT: 169 LBS

## 2018-07-18 DIAGNOSIS — F17.210: ICD-10-CM

## 2018-07-18 DIAGNOSIS — I10: ICD-10-CM

## 2018-07-18 DIAGNOSIS — E11.65: ICD-10-CM

## 2018-07-18 DIAGNOSIS — E13.40: ICD-10-CM

## 2018-07-18 DIAGNOSIS — E44.1: ICD-10-CM

## 2018-07-18 DIAGNOSIS — K21.9: ICD-10-CM

## 2018-07-18 DIAGNOSIS — T81.4XXA: Primary | ICD-10-CM

## 2018-07-18 DIAGNOSIS — R42: ICD-10-CM

## 2018-07-18 LAB
ALBUMIN SERPL-MCNC: 3.1 G/DL (ref 3.4–5)
ALP SERPL-CCNC: 98 U/L (ref 45–117)
ALT SERPL-CCNC: 22 U/L (ref 16–61)
ANION GAP SERPL CALCULATED.3IONS-SCNC: 13 MMOL/L (ref 5–15)
BILIRUB SERPL-MCNC: 0.7 MG/DL (ref 0.2–1)
BUN SERPL-MCNC: 15 MG/DL (ref 7–18)
BUN/CREAT SERPL: 15.3
CALCIUM SERPL-MCNC: 8.9 MG/DL (ref 8.5–10.1)
CHLORIDE SERPL-SCNC: 81 MMOL/L (ref 98–107)
CO2 SERPL-SCNC: 22 MMOL/L (ref 21–32)
GLUCOSE SERPL-MCNC: 292 MG/DL (ref 74–106)
HCT VFR BLD AUTO: 33.4 % (ref 41–53)
HGB BLD-MCNC: 12 G/DL (ref 13.5–17.5)
MCH RBC QN AUTO: 35.8 PG (ref 28–32)
MCV RBC AUTO: 99.8 FL (ref 80–100)
NRBC BLD QL AUTO: 0 %
POTASSIUM SERPL-SCNC: 4.6 MMOL/L (ref 3.5–5.1)
PROT SERPL-MCNC: 7.9 G/DL (ref 6.4–8.2)
SODIUM SERPL-SCNC: 116 MMOL/L (ref 136–145)

## 2018-07-18 PROCEDURE — 80053 COMPREHEN METABOLIC PANEL: CPT

## 2018-07-18 PROCEDURE — 93005 ELECTROCARDIOGRAM TRACING: CPT

## 2018-07-18 PROCEDURE — 85025 COMPLETE CBC W/AUTO DIFF WBC: CPT

## 2018-07-18 PROCEDURE — 83735 ASSAY OF MAGNESIUM: CPT

## 2018-07-18 PROCEDURE — 70450 CT HEAD/BRAIN W/O DYE: CPT

## 2018-07-18 PROCEDURE — 96375 TX/PRO/DX INJ NEW DRUG ADDON: CPT

## 2018-07-18 PROCEDURE — 87077 CULTURE AEROBIC IDENTIFY: CPT

## 2018-07-18 PROCEDURE — 96366 THER/PROPH/DIAG IV INF ADDON: CPT

## 2018-07-18 PROCEDURE — 81001 URINALYSIS AUTO W/SCOPE: CPT

## 2018-07-18 PROCEDURE — 94761 N-INVAS EAR/PLS OXIMETRY MLT: CPT

## 2018-07-18 PROCEDURE — 99285 EMERGENCY DEPT VISIT HI MDM: CPT

## 2018-07-18 PROCEDURE — 96365 THER/PROPH/DIAG IV INF INIT: CPT

## 2018-07-18 PROCEDURE — 71046 X-RAY EXAM CHEST 2 VIEWS: CPT

## 2018-07-18 PROCEDURE — 36415 COLL VENOUS BLD VENIPUNCTURE: CPT

## 2018-07-18 PROCEDURE — 87205 SMEAR GRAM STAIN: CPT

## 2018-07-18 PROCEDURE — 87186 SC STD MICRODIL/AGAR DIL: CPT

## 2018-07-19 VITALS — DIASTOLIC BLOOD PRESSURE: 76 MMHG | SYSTOLIC BLOOD PRESSURE: 128 MMHG

## 2018-12-13 ENCOUNTER — HOSPITAL ENCOUNTER (EMERGENCY)
Dept: HOSPITAL 15 - ER | Age: 53
Discharge: HOME | End: 2018-12-13
Payer: MEDICAID

## 2018-12-13 VITALS — HEIGHT: 67 IN | BODY MASS INDEX: 26.68 KG/M2 | WEIGHT: 170 LBS

## 2018-12-13 VITALS — DIASTOLIC BLOOD PRESSURE: 78 MMHG | SYSTOLIC BLOOD PRESSURE: 132 MMHG

## 2018-12-13 DIAGNOSIS — I10: ICD-10-CM

## 2018-12-13 DIAGNOSIS — Z79.899: ICD-10-CM

## 2018-12-13 DIAGNOSIS — F10.20: ICD-10-CM

## 2018-12-13 DIAGNOSIS — R10.13: Primary | ICD-10-CM

## 2018-12-13 DIAGNOSIS — F17.210: ICD-10-CM

## 2018-12-13 DIAGNOSIS — K21.9: ICD-10-CM

## 2018-12-13 DIAGNOSIS — Z79.4: ICD-10-CM

## 2018-12-13 DIAGNOSIS — R11.10: ICD-10-CM

## 2018-12-13 DIAGNOSIS — E11.9: ICD-10-CM

## 2018-12-13 LAB
ALBUMIN SERPL-MCNC: 2 G/DL (ref 3.4–5)
ALP SERPL-CCNC: 71 U/L (ref 45–117)
ALT SERPL-CCNC: 12 U/L (ref 16–61)
ANION GAP SERPL CALCULATED.3IONS-SCNC: 7 MMOL/L (ref 5–15)
BILIRUB SERPL-MCNC: 0.2 MG/DL (ref 0.2–1)
BUN SERPL-MCNC: 29 MG/DL (ref 7–18)
BUN/CREAT SERPL: 23.8
CALCIUM SERPL-MCNC: 7.4 MG/DL (ref 8.5–10.1)
CHLORIDE SERPL-SCNC: 106 MMOL/L (ref 98–107)
CO2 SERPL-SCNC: 21 MMOL/L (ref 21–32)
GLUCOSE SERPL-MCNC: 335 MG/DL (ref 74–106)
HCT VFR BLD AUTO: 25.2 % (ref 41–53)
HGB BLD-MCNC: 8.3 G/DL (ref 13.5–17.5)
LIPASE SERPL-CCNC: 22 U/L (ref 73–393)
MAGNESIUM SERPL-MCNC: 1.8 MG/DL (ref 1.6–2.6)
MCH RBC QN AUTO: 29.6 PG (ref 28–32)
MCV RBC AUTO: 89.6 FL (ref 80–100)
NRBC BLD QL AUTO: 0 %
POTASSIUM SERPL-SCNC: 4 MMOL/L (ref 3.5–5.1)
PROT SERPL-MCNC: 4.8 G/DL (ref 6.4–8.2)
SODIUM SERPL-SCNC: 134 MMOL/L (ref 136–145)

## 2018-12-13 PROCEDURE — 83735 ASSAY OF MAGNESIUM: CPT

## 2018-12-13 PROCEDURE — 96361 HYDRATE IV INFUSION ADD-ON: CPT

## 2018-12-13 PROCEDURE — 36415 COLL VENOUS BLD VENIPUNCTURE: CPT

## 2018-12-13 PROCEDURE — 96374 THER/PROPH/DIAG INJ IV PUSH: CPT

## 2018-12-13 PROCEDURE — 83690 ASSAY OF LIPASE: CPT

## 2018-12-13 PROCEDURE — 96375 TX/PRO/DX INJ NEW DRUG ADDON: CPT

## 2018-12-13 PROCEDURE — 74176 CT ABD & PELVIS W/O CONTRAST: CPT

## 2018-12-13 PROCEDURE — 80053 COMPREHEN METABOLIC PANEL: CPT

## 2018-12-13 PROCEDURE — 93005 ELECTROCARDIOGRAM TRACING: CPT

## 2018-12-13 PROCEDURE — 80320 DRUG SCREEN QUANTALCOHOLS: CPT

## 2018-12-13 PROCEDURE — 94761 N-INVAS EAR/PLS OXIMETRY MLT: CPT

## 2018-12-13 PROCEDURE — 85025 COMPLETE CBC W/AUTO DIFF WBC: CPT

## 2018-12-13 PROCEDURE — 99284 EMERGENCY DEPT VISIT MOD MDM: CPT

## 2018-12-13 PROCEDURE — 84484 ASSAY OF TROPONIN QUANT: CPT

## 2018-12-13 RX ADMIN — PANTOPRAZOLE SODIUM STA MG: 40 INJECTION, POWDER, FOR SOLUTION INTRAVENOUS at 11:46

## 2018-12-13 RX ADMIN — SODIUM CHLORIDE ONE MLS/HR: 0.9 INJECTION, SOLUTION INTRAVENOUS at 11:46

## 2018-12-13 RX ADMIN — ONDANSETRON HYDROCHLORIDE ONE MG: 2 INJECTION, SOLUTION INTRAMUSCULAR; INTRAVENOUS at 11:50

## 2018-12-13 RX ADMIN — LORAZEPAM ONE MG: 2 INJECTION, SOLUTION INTRAMUSCULAR; INTRAVENOUS at 11:50

## 2019-02-20 ENCOUNTER — HOSPITAL ENCOUNTER (INPATIENT)
Dept: HOSPITAL 15 - ER | Age: 54
LOS: 2 days | Discharge: HOME | End: 2019-02-22
Payer: MEDICAID

## 2019-02-20 DIAGNOSIS — E87.1: ICD-10-CM

## 2019-02-20 DIAGNOSIS — K29.20: ICD-10-CM

## 2019-02-20 DIAGNOSIS — I10: ICD-10-CM

## 2019-02-20 DIAGNOSIS — K86.1: ICD-10-CM

## 2019-02-20 DIAGNOSIS — G89.29: ICD-10-CM

## 2019-02-20 DIAGNOSIS — E11.649: Primary | ICD-10-CM

## 2019-02-20 DIAGNOSIS — K27.9: ICD-10-CM

## 2019-02-20 DIAGNOSIS — G40.909: ICD-10-CM

## 2019-02-20 DIAGNOSIS — E11.43: ICD-10-CM

## 2019-02-20 DIAGNOSIS — F10.229: ICD-10-CM

## 2019-02-20 DIAGNOSIS — E43: ICD-10-CM

## 2019-02-20 DIAGNOSIS — E83.42: ICD-10-CM

## 2019-02-20 DIAGNOSIS — E87.5: ICD-10-CM

## 2019-04-20 ENCOUNTER — HOSPITAL ENCOUNTER (INPATIENT)
Dept: HOSPITAL 15 - ER | Age: 54
LOS: 9 days | DRG: 710 | End: 2019-04-29
Attending: INTERNAL MEDICINE | Admitting: NURSE PRACTITIONER
Payer: MEDICAID

## 2019-04-20 VITALS — BODY MASS INDEX: 33.31 KG/M2 | HEIGHT: 69 IN | WEIGHT: 224.87 LBS

## 2019-04-20 DIAGNOSIS — E86.1: ICD-10-CM

## 2019-04-20 DIAGNOSIS — Z51.5: ICD-10-CM

## 2019-04-20 DIAGNOSIS — J96.01: ICD-10-CM

## 2019-04-20 DIAGNOSIS — E11.52: ICD-10-CM

## 2019-04-20 DIAGNOSIS — E44.0: ICD-10-CM

## 2019-04-20 DIAGNOSIS — K21.9: ICD-10-CM

## 2019-04-20 DIAGNOSIS — E87.5: ICD-10-CM

## 2019-04-20 DIAGNOSIS — Z66: ICD-10-CM

## 2019-04-20 DIAGNOSIS — A41.9: Primary | ICD-10-CM

## 2019-04-20 DIAGNOSIS — F10.239: ICD-10-CM

## 2019-04-20 DIAGNOSIS — D64.9: ICD-10-CM

## 2019-04-20 DIAGNOSIS — F32.9: ICD-10-CM

## 2019-04-20 DIAGNOSIS — K26.5: ICD-10-CM

## 2019-04-20 DIAGNOSIS — G89.29: ICD-10-CM

## 2019-04-20 DIAGNOSIS — T43.622A: ICD-10-CM

## 2019-04-20 DIAGNOSIS — N17.0: ICD-10-CM

## 2019-04-20 DIAGNOSIS — K65.9: ICD-10-CM

## 2019-04-20 DIAGNOSIS — J44.9: ICD-10-CM

## 2019-04-20 DIAGNOSIS — E87.6: ICD-10-CM

## 2019-04-20 DIAGNOSIS — E11.21: ICD-10-CM

## 2019-04-20 DIAGNOSIS — K72.90: ICD-10-CM

## 2019-04-20 DIAGNOSIS — J15.211: ICD-10-CM

## 2019-04-20 DIAGNOSIS — Z87.11: ICD-10-CM

## 2019-04-20 DIAGNOSIS — D68.69: ICD-10-CM

## 2019-04-20 DIAGNOSIS — E83.51: ICD-10-CM

## 2019-04-20 DIAGNOSIS — I63.9: ICD-10-CM

## 2019-04-20 DIAGNOSIS — D61.818: ICD-10-CM
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
## 2019-04-20 DIAGNOSIS — D69.6: ICD-10-CM

## 2019-04-20 DIAGNOSIS — G92: ICD-10-CM

## 2019-04-20 DIAGNOSIS — E87.1: ICD-10-CM

## 2019-04-20 DIAGNOSIS — I82.611: ICD-10-CM

## 2019-04-20 DIAGNOSIS — R65.21: ICD-10-CM

## 2019-04-20 DIAGNOSIS — I48.92: ICD-10-CM

## 2019-04-20 DIAGNOSIS — I10: ICD-10-CM

## 2019-04-20 DIAGNOSIS — N39.0: ICD-10-CM

## 2019-04-20 DIAGNOSIS — G40.89: ICD-10-CM

## 2019-04-20 LAB
HCT VFR BLD AUTO: 46.2 % (ref 41–53)
HGB BLD-MCNC: 15.2 G/DL (ref 13.5–17.5)
MCH RBC QN AUTO: 31.6 PG (ref 28–32)
MCV RBC AUTO: 95.9 FL (ref 80–100)

## 2019-04-20 PROCEDURE — 96374 THER/PROPH/DIAG INJ IV PUSH: CPT

## 2019-04-20 PROCEDURE — 84300 ASSAY OF URINE SODIUM: CPT

## 2019-04-20 PROCEDURE — 94761 N-INVAS EAR/PLS OXIMETRY MLT: CPT

## 2019-04-20 PROCEDURE — 82805 BLOOD GASES W/O2 SATURATION: CPT

## 2019-04-20 PROCEDURE — 84484 ASSAY OF TROPONIN QUANT: CPT

## 2019-04-20 PROCEDURE — 84156 ASSAY OF PROTEIN URINE: CPT

## 2019-04-20 PROCEDURE — 87040 BLOOD CULTURE FOR BACTERIA: CPT

## 2019-04-20 PROCEDURE — 82570 ASSAY OF URINE CREATININE: CPT

## 2019-04-20 PROCEDURE — 87186 SC STD MICRODIL/AGAR DIL: CPT

## 2019-04-20 PROCEDURE — 85362 FIBRIN DEGRADATION PRODUCTS: CPT

## 2019-04-20 PROCEDURE — 87205 SMEAR GRAM STAIN: CPT

## 2019-04-20 PROCEDURE — 81001 URINALYSIS AUTO W/SCOPE: CPT

## 2019-04-20 PROCEDURE — 84132 ASSAY OF SERUM POTASSIUM: CPT

## 2019-04-20 PROCEDURE — 84443 ASSAY THYROID STIM HORMONE: CPT

## 2019-04-20 PROCEDURE — 86900 BLOOD TYPING SEROLOGIC ABO: CPT

## 2019-04-20 PROCEDURE — 95819 EEG AWAKE AND ASLEEP: CPT

## 2019-04-20 PROCEDURE — 82607 VITAMIN B-12: CPT

## 2019-04-20 PROCEDURE — 82550 ASSAY OF CK (CPK): CPT

## 2019-04-20 PROCEDURE — 82962 GLUCOSE BLOOD TEST: CPT

## 2019-04-20 PROCEDURE — 85025 COMPLETE CBC W/AUTO DIFF WBC: CPT

## 2019-04-20 PROCEDURE — 84100 ASSAY OF PHOSPHORUS: CPT

## 2019-04-20 PROCEDURE — 94644 CONT INHLJ TX 1ST HOUR: CPT

## 2019-04-20 PROCEDURE — 85610 PROTHROMBIN TIME: CPT

## 2019-04-20 PROCEDURE — 96376 TX/PRO/DX INJ SAME DRUG ADON: CPT

## 2019-04-20 PROCEDURE — 86901 BLOOD TYPING SEROLOGIC RH(D): CPT

## 2019-04-20 PROCEDURE — 80053 COMPREHEN METABOLIC PANEL: CPT

## 2019-04-20 PROCEDURE — 85027 COMPLETE CBC AUTOMATED: CPT

## 2019-04-20 PROCEDURE — 93971 EXTREMITY STUDY: CPT

## 2019-04-20 PROCEDURE — 82746 ASSAY OF FOLIC ACID SERUM: CPT

## 2019-04-20 PROCEDURE — 70450 CT HEAD/BRAIN W/O DYE: CPT

## 2019-04-20 PROCEDURE — 82306 VITAMIN D 25 HYDROXY: CPT

## 2019-04-20 PROCEDURE — 83550 IRON BINDING TEST: CPT

## 2019-04-20 PROCEDURE — 83010 ASSAY OF HAPTOGLOBIN QUANT: CPT

## 2019-04-20 PROCEDURE — 83605 ASSAY OF LACTIC ACID: CPT

## 2019-04-20 PROCEDURE — 36415 COLL VENOUS BLD VENIPUNCTURE: CPT

## 2019-04-20 PROCEDURE — 83970 ASSAY OF PARATHORMONE: CPT

## 2019-04-20 PROCEDURE — 83690 ASSAY OF LIPASE: CPT

## 2019-04-20 PROCEDURE — 80320 DRUG SCREEN QUANTALCOHOLS: CPT

## 2019-04-20 PROCEDURE — 96361 HYDRATE IV INFUSION ADD-ON: CPT

## 2019-04-20 PROCEDURE — 93005 ELECTROCARDIOGRAM TRACING: CPT

## 2019-04-20 PROCEDURE — 51702 INSERT TEMP BLADDER CATH: CPT

## 2019-04-20 PROCEDURE — 83880 ASSAY OF NATRIURETIC PEPTIDE: CPT

## 2019-04-20 PROCEDURE — 87070 CULTURE OTHR SPECIMN AEROBIC: CPT

## 2019-04-20 PROCEDURE — 80307 DRUG TEST PRSMV CHEM ANLYZR: CPT

## 2019-04-20 PROCEDURE — 80202 ASSAY OF VANCOMYCIN: CPT

## 2019-04-20 PROCEDURE — 82010 KETONE BODYS QUAN: CPT

## 2019-04-20 PROCEDURE — 84550 ASSAY OF BLOOD/URIC ACID: CPT

## 2019-04-20 PROCEDURE — 96375 TX/PRO/DX INJ NEW DRUG ADDON: CPT

## 2019-04-20 PROCEDURE — 86880 COOMBS TEST DIRECT: CPT

## 2019-04-20 PROCEDURE — 83615 LACTATE (LD) (LDH) ENZYME: CPT

## 2019-04-20 PROCEDURE — 71045 X-RAY EXAM CHEST 1 VIEW: CPT

## 2019-04-20 PROCEDURE — 74176 CT ABD & PELVIS W/O CONTRAST: CPT

## 2019-04-20 PROCEDURE — 85007 BL SMEAR W/DIFF WBC COUNT: CPT

## 2019-04-20 PROCEDURE — 93306 TTE W/DOPPLER COMPLETE: CPT

## 2019-04-20 PROCEDURE — 84478 ASSAY OF TRIGLYCERIDES: CPT

## 2019-04-20 PROCEDURE — 82728 ASSAY OF FERRITIN: CPT

## 2019-04-20 PROCEDURE — 87086 URINE CULTURE/COLONY COUNT: CPT

## 2019-04-20 PROCEDURE — 86850 RBC ANTIBODY SCREEN: CPT

## 2019-04-20 PROCEDURE — 83735 ASSAY OF MAGNESIUM: CPT

## 2019-04-20 PROCEDURE — 82040 ASSAY OF SERUM ALBUMIN: CPT

## 2019-04-20 PROCEDURE — 36600 WITHDRAWAL OF ARTERIAL BLOOD: CPT

## 2019-04-20 PROCEDURE — 85384 FIBRINOGEN ACTIVITY: CPT

## 2019-04-20 PROCEDURE — 82150 ASSAY OF AMYLASE: CPT

## 2019-04-20 PROCEDURE — 94003 VENT MGMT INPAT SUBQ DAY: CPT

## 2019-04-20 PROCEDURE — 94002 VENT MGMT INPAT INIT DAY: CPT

## 2019-04-20 PROCEDURE — 80329 ANALGESICS NON-OPIOID 1 OR 2: CPT

## 2019-04-20 PROCEDURE — 94640 AIRWAY INHALATION TREATMENT: CPT

## 2019-04-20 PROCEDURE — 80048 BASIC METABOLIC PNL TOTAL CA: CPT

## 2019-04-20 PROCEDURE — 76937 US GUIDE VASCULAR ACCESS: CPT

## 2019-04-20 PROCEDURE — 87077 CULTURE AEROBIC IDENTIFY: CPT

## 2019-04-20 PROCEDURE — 87081 CULTURE SCREEN ONLY: CPT

## 2019-04-20 PROCEDURE — 83036 HEMOGLOBIN GLYCOSYLATED A1C: CPT

## 2019-04-20 PROCEDURE — 83540 ASSAY OF IRON: CPT

## 2019-04-21 VITALS — SYSTOLIC BLOOD PRESSURE: 120 MMHG | DIASTOLIC BLOOD PRESSURE: 56 MMHG

## 2019-04-21 VITALS — SYSTOLIC BLOOD PRESSURE: 111 MMHG | DIASTOLIC BLOOD PRESSURE: 42 MMHG

## 2019-04-21 VITALS — DIASTOLIC BLOOD PRESSURE: 50 MMHG | SYSTOLIC BLOOD PRESSURE: 82 MMHG

## 2019-04-21 VITALS — DIASTOLIC BLOOD PRESSURE: 54 MMHG | SYSTOLIC BLOOD PRESSURE: 97 MMHG

## 2019-04-21 VITALS — SYSTOLIC BLOOD PRESSURE: 82 MMHG | DIASTOLIC BLOOD PRESSURE: 52 MMHG

## 2019-04-21 VITALS — DIASTOLIC BLOOD PRESSURE: 41 MMHG | SYSTOLIC BLOOD PRESSURE: 124 MMHG

## 2019-04-21 VITALS — SYSTOLIC BLOOD PRESSURE: 85 MMHG | DIASTOLIC BLOOD PRESSURE: 51 MMHG

## 2019-04-21 VITALS — DIASTOLIC BLOOD PRESSURE: 48 MMHG | SYSTOLIC BLOOD PRESSURE: 112 MMHG

## 2019-04-21 VITALS — DIASTOLIC BLOOD PRESSURE: 50 MMHG | SYSTOLIC BLOOD PRESSURE: 106 MMHG

## 2019-04-21 VITALS — DIASTOLIC BLOOD PRESSURE: 39 MMHG | SYSTOLIC BLOOD PRESSURE: 82 MMHG

## 2019-04-21 VITALS — DIASTOLIC BLOOD PRESSURE: 50 MMHG | SYSTOLIC BLOOD PRESSURE: 107 MMHG

## 2019-04-21 VITALS — SYSTOLIC BLOOD PRESSURE: 89 MMHG | DIASTOLIC BLOOD PRESSURE: 49 MMHG

## 2019-04-21 VITALS — DIASTOLIC BLOOD PRESSURE: 46 MMHG | SYSTOLIC BLOOD PRESSURE: 112 MMHG

## 2019-04-21 VITALS — DIASTOLIC BLOOD PRESSURE: 45 MMHG | SYSTOLIC BLOOD PRESSURE: 89 MMHG

## 2019-04-21 VITALS — DIASTOLIC BLOOD PRESSURE: 43 MMHG | SYSTOLIC BLOOD PRESSURE: 105 MMHG

## 2019-04-21 VITALS — DIASTOLIC BLOOD PRESSURE: 44 MMHG | SYSTOLIC BLOOD PRESSURE: 94 MMHG

## 2019-04-21 VITALS — SYSTOLIC BLOOD PRESSURE: 147 MMHG | DIASTOLIC BLOOD PRESSURE: 69 MMHG

## 2019-04-21 VITALS — DIASTOLIC BLOOD PRESSURE: 44 MMHG | SYSTOLIC BLOOD PRESSURE: 98 MMHG

## 2019-04-21 VITALS — DIASTOLIC BLOOD PRESSURE: 48 MMHG | SYSTOLIC BLOOD PRESSURE: 123 MMHG

## 2019-04-21 VITALS — SYSTOLIC BLOOD PRESSURE: 76 MMHG | DIASTOLIC BLOOD PRESSURE: 54 MMHG

## 2019-04-21 VITALS — DIASTOLIC BLOOD PRESSURE: 64 MMHG | SYSTOLIC BLOOD PRESSURE: 130 MMHG

## 2019-04-21 VITALS — DIASTOLIC BLOOD PRESSURE: 52 MMHG | SYSTOLIC BLOOD PRESSURE: 92 MMHG

## 2019-04-21 VITALS — DIASTOLIC BLOOD PRESSURE: 39 MMHG | SYSTOLIC BLOOD PRESSURE: 88 MMHG

## 2019-04-21 VITALS — DIASTOLIC BLOOD PRESSURE: 55 MMHG | SYSTOLIC BLOOD PRESSURE: 111 MMHG

## 2019-04-21 VITALS — SYSTOLIC BLOOD PRESSURE: 91 MMHG | DIASTOLIC BLOOD PRESSURE: 68 MMHG

## 2019-04-21 VITALS — DIASTOLIC BLOOD PRESSURE: 28 MMHG | SYSTOLIC BLOOD PRESSURE: 78 MMHG

## 2019-04-21 VITALS — DIASTOLIC BLOOD PRESSURE: 46 MMHG | SYSTOLIC BLOOD PRESSURE: 107 MMHG

## 2019-04-21 VITALS — SYSTOLIC BLOOD PRESSURE: 78 MMHG | DIASTOLIC BLOOD PRESSURE: 49 MMHG

## 2019-04-21 VITALS — DIASTOLIC BLOOD PRESSURE: 58 MMHG | SYSTOLIC BLOOD PRESSURE: 114 MMHG

## 2019-04-21 VITALS — DIASTOLIC BLOOD PRESSURE: 44 MMHG | SYSTOLIC BLOOD PRESSURE: 104 MMHG

## 2019-04-21 VITALS — DIASTOLIC BLOOD PRESSURE: 34 MMHG | SYSTOLIC BLOOD PRESSURE: 97 MMHG

## 2019-04-21 VITALS — SYSTOLIC BLOOD PRESSURE: 87 MMHG | DIASTOLIC BLOOD PRESSURE: 61 MMHG

## 2019-04-21 VITALS — SYSTOLIC BLOOD PRESSURE: 133 MMHG | DIASTOLIC BLOOD PRESSURE: 78 MMHG

## 2019-04-21 VITALS — SYSTOLIC BLOOD PRESSURE: 107 MMHG | DIASTOLIC BLOOD PRESSURE: 50 MMHG

## 2019-04-21 VITALS — DIASTOLIC BLOOD PRESSURE: 108 MMHG | SYSTOLIC BLOOD PRESSURE: 129 MMHG

## 2019-04-21 VITALS — SYSTOLIC BLOOD PRESSURE: 94 MMHG | DIASTOLIC BLOOD PRESSURE: 43 MMHG

## 2019-04-21 VITALS — SYSTOLIC BLOOD PRESSURE: 80 MMHG | DIASTOLIC BLOOD PRESSURE: 42 MMHG

## 2019-04-21 VITALS — DIASTOLIC BLOOD PRESSURE: 71 MMHG | SYSTOLIC BLOOD PRESSURE: 112 MMHG

## 2019-04-21 VITALS — SYSTOLIC BLOOD PRESSURE: 109 MMHG | DIASTOLIC BLOOD PRESSURE: 39 MMHG

## 2019-04-21 VITALS — DIASTOLIC BLOOD PRESSURE: 57 MMHG | SYSTOLIC BLOOD PRESSURE: 102 MMHG

## 2019-04-21 VITALS — DIASTOLIC BLOOD PRESSURE: 44 MMHG | SYSTOLIC BLOOD PRESSURE: 85 MMHG

## 2019-04-21 VITALS — SYSTOLIC BLOOD PRESSURE: 94 MMHG | DIASTOLIC BLOOD PRESSURE: 39 MMHG

## 2019-04-21 VITALS — DIASTOLIC BLOOD PRESSURE: 48 MMHG | SYSTOLIC BLOOD PRESSURE: 97 MMHG

## 2019-04-21 VITALS — DIASTOLIC BLOOD PRESSURE: 44 MMHG | SYSTOLIC BLOOD PRESSURE: 78 MMHG

## 2019-04-21 VITALS — SYSTOLIC BLOOD PRESSURE: 118 MMHG | DIASTOLIC BLOOD PRESSURE: 60 MMHG

## 2019-04-21 VITALS — DIASTOLIC BLOOD PRESSURE: 101 MMHG | SYSTOLIC BLOOD PRESSURE: 129 MMHG

## 2019-04-21 VITALS — DIASTOLIC BLOOD PRESSURE: 47 MMHG | SYSTOLIC BLOOD PRESSURE: 102 MMHG

## 2019-04-21 VITALS — SYSTOLIC BLOOD PRESSURE: 87 MMHG | DIASTOLIC BLOOD PRESSURE: 48 MMHG

## 2019-04-21 VITALS — DIASTOLIC BLOOD PRESSURE: 54 MMHG | SYSTOLIC BLOOD PRESSURE: 115 MMHG

## 2019-04-21 VITALS — DIASTOLIC BLOOD PRESSURE: 34 MMHG | SYSTOLIC BLOOD PRESSURE: 94 MMHG

## 2019-04-21 VITALS — DIASTOLIC BLOOD PRESSURE: 78 MMHG | SYSTOLIC BLOOD PRESSURE: 113 MMHG

## 2019-04-21 VITALS — DIASTOLIC BLOOD PRESSURE: 69 MMHG | SYSTOLIC BLOOD PRESSURE: 130 MMHG

## 2019-04-21 VITALS — SYSTOLIC BLOOD PRESSURE: 104 MMHG | DIASTOLIC BLOOD PRESSURE: 71 MMHG

## 2019-04-21 VITALS — DIASTOLIC BLOOD PRESSURE: 41 MMHG | SYSTOLIC BLOOD PRESSURE: 105 MMHG

## 2019-04-21 VITALS — DIASTOLIC BLOOD PRESSURE: 98 MMHG | SYSTOLIC BLOOD PRESSURE: 133 MMHG

## 2019-04-21 VITALS — DIASTOLIC BLOOD PRESSURE: 63 MMHG | SYSTOLIC BLOOD PRESSURE: 127 MMHG

## 2019-04-21 VITALS — SYSTOLIC BLOOD PRESSURE: 105 MMHG | DIASTOLIC BLOOD PRESSURE: 41 MMHG

## 2019-04-21 LAB
ALBUMIN SERPL-MCNC: 2.4 G/DL (ref 3.4–5)
ALBUMIN SERPL-MCNC: 2.4 G/DL (ref 3.4–5)
ALCOHOL, URINE: < 3 MG/DL (ref 0–5)
ALP SERPL-CCNC: 41 U/L (ref 45–117)
ALP SERPL-CCNC: 43 U/L (ref 45–117)
ALT SERPL-CCNC: 34 U/L (ref 16–61)
ALT SERPL-CCNC: 35 U/L (ref 16–61)
AMPHETAMINES UR QL SCN: POSITIVE
ANION GAP SERPL CALCULATED.3IONS-SCNC: 14 MMOL/L (ref 5–15)
ANION GAP SERPL CALCULATED.3IONS-SCNC: 16 MMOL/L (ref 5–15)
ANION GAP SERPL CALCULATED.3IONS-SCNC: 18 MMOL/L (ref 5–15)
ANION GAP SERPL CALCULATED.3IONS-SCNC: 25 MMOL/L (ref 5–15)
APAP SERPL-MCNC: < 2 UG/ML (ref 10–30)
BARBITURATES UR QL SCN: NEGATIVE
BENZODIAZ UR QL SCN: NEGATIVE
BILIRUB SERPL-MCNC: 0.5 MG/DL (ref 0.2–1)
BILIRUB SERPL-MCNC: 0.5 MG/DL (ref 0.2–1)
BUN SERPL-MCNC: 51 MG/DL (ref 7–18)
BUN SERPL-MCNC: 58 MG/DL (ref 7–18)
BUN SERPL-MCNC: 59 MG/DL (ref 7–18)
BUN SERPL-MCNC: 61 MG/DL (ref 7–18)
BUN/CREAT SERPL: 10
BUN/CREAT SERPL: 10.9
BUN/CREAT SERPL: 11.2
BUN/CREAT SERPL: 11.3
BZE UR QL SCN: NEGATIVE
CALCIUM SERPL-MCNC: 8.8 MG/DL (ref 8.5–10.1)
CALCIUM SERPL-MCNC: 9 MG/DL (ref 8.5–10.1)
CALCIUM SERPL-MCNC: 9.6 MG/DL (ref 8.5–10.1)
CALCIUM SERPL-MCNC: 9.9 MG/DL (ref 8.5–10.1)
CANNABINOIDS UR QL SCN: NEGATIVE
CHLORIDE SERPL-SCNC: 85 MMOL/L (ref 98–107)
CHLORIDE SERPL-SCNC: 92 MMOL/L (ref 98–107)
CHLORIDE SERPL-SCNC: 96 MMOL/L (ref 98–107)
CHLORIDE SERPL-SCNC: 96 MMOL/L (ref 98–107)
CO2 SERPL-SCNC: 16 MMOL/L (ref 21–32)
CO2 SERPL-SCNC: 17 MMOL/L (ref 21–32)
CO2 SERPL-SCNC: 20 MMOL/L (ref 21–32)
CO2 SERPL-SCNC: 8 MMOL/L (ref 21–32)
GLUCOSE SERPL-MCNC: 160 MG/DL (ref 74–106)
GLUCOSE SERPL-MCNC: 170 MG/DL (ref 74–106)
GLUCOSE SERPL-MCNC: 202 MG/DL (ref 74–106)
GLUCOSE SERPL-MCNC: 206 MG/DL (ref 74–106)
HCT VFR BLD AUTO: 35.5 % (ref 41–53)
HCT VFR BLD AUTO: 42.2 % (ref 41–53)
HGB BLD-MCNC: 12.1 G/DL (ref 13.5–17.5)
HGB BLD-MCNC: 14.2 G/DL (ref 13.5–17.5)
INR PPP: 0.98 (ref 0.9–1.15)
LACTATE PLASV-SCNC: 2 MMOL/L (ref 0.4–2)
LACTATE PLASV-SCNC: 3.2 MMOL/L (ref 0.4–2)
MAGNESIUM SERPL-MCNC: 2.3 MG/DL (ref 1.6–2.6)
MCH RBC QN AUTO: 31.1 PG (ref 28–32)
MCH RBC QN AUTO: 31.5 PG (ref 28–32)
MCV RBC AUTO: 92.2 FL (ref 80–100)
MCV RBC AUTO: 92.6 FL (ref 80–100)
OPIATES UR QL SCN: NEGATIVE
PCP UR QL SCN: NEGATIVE
POTASSIUM SERPL-SCNC: 5.4 MMOL/L (ref 3.5–5.1)
POTASSIUM SERPL-SCNC: 5.8 MMOL/L (ref 3.5–5.1)
POTASSIUM SERPL-SCNC: 5.9 MMOL/L (ref 3.5–5.1)
POTASSIUM SERPL-SCNC: 5.9 MMOL/L (ref 3.5–5.1)
PROT SERPL-MCNC: 6.2 G/DL (ref 6.4–8.2)
PROT SERPL-MCNC: 6.9 G/DL (ref 6.4–8.2)
PROTHROMBIN TIME: 10.5 SEC (ref 9.27–12.13)
SALICYLATES SERPL-MCNC: < 1.7 MG/DL (ref 2.8–20)
SODIUM SERPL-SCNC: 118 MMOL/L (ref 136–145)
SODIUM SERPL-SCNC: 126 MMOL/L (ref 136–145)
SODIUM SERPL-SCNC: 129 MMOL/L (ref 136–145)
SODIUM SERPL-SCNC: 130 MMOL/L (ref 136–145)
WBC CLUMPS UR QL AUTO: PRESENT /HPF

## 2019-04-21 PROCEDURE — 5A1955Z RESPIRATORY VENTILATION, GREATER THAN 96 CONSECUTIVE HOURS: ICD-10-PCS | Performed by: SURGERY

## 2019-04-21 PROCEDURE — 0BH17EZ INSERTION OF ENDOTRACHEAL AIRWAY INTO TRACHEA, VIA NATURAL OR ARTIFICIAL OPENING: ICD-10-PCS | Performed by: SURGERY

## 2019-04-21 PROCEDURE — B244ZZZ ULTRASONOGRAPHY OF RIGHT HEART: ICD-10-PCS | Performed by: SURGERY

## 2019-04-21 PROCEDURE — 02H633Z INSERTION OF INFUSION DEVICE INTO RIGHT ATRIUM, PERCUTANEOUS APPROACH: ICD-10-PCS | Performed by: SURGERY

## 2019-04-21 RX ADMIN — DEXTROSE SCH MLS/HR: 5 SOLUTION INTRAVENOUS at 20:17

## 2019-04-21 RX ADMIN — SODIUM CHLORIDE SCH MLS/HR: 0.9 INJECTION, SOLUTION INTRAVENOUS at 18:26

## 2019-04-21 RX ADMIN — LINEZOLID SCH MLS/HR: 600 INJECTION, SOLUTION INTRAVENOUS at 22:24

## 2019-04-21 RX ADMIN — MIDAZOLAM SCH MLS/HR: 5 INJECTION, SOLUTION INTRAMUSCULAR; INTRAVENOUS at 21:00

## 2019-04-21 RX ADMIN — NOREPINEPHRINE BITARTRATE SCH MLS/HR: 1 INJECTION, SOLUTION, CONCENTRATE INTRAVENOUS at 19:45

## 2019-04-21 RX ADMIN — AMIODARONE HYDROCHLORIDE SCH MLS/HR: 50 INJECTION, SOLUTION INTRAVENOUS at 19:00

## 2019-04-21 RX ADMIN — SODIUM CHLORIDE SCH MLS/HR: 0.9 INJECTION, SOLUTION INTRAVENOUS at 16:00

## 2019-04-21 RX ADMIN — HUMAN INSULIN SCH UNITS: 100 INJECTION, SOLUTION SUBCUTANEOUS at 22:24

## 2019-04-21 RX ADMIN — Medication SCH STRIP: at 22:24

## 2019-04-21 RX ADMIN — PROPOFOL SCH MLS/HR: 10 INJECTION, EMULSION INTRAVENOUS at 22:30

## 2019-04-21 RX ADMIN — HUMAN INSULIN SCH UNITS: 100 INJECTION, SOLUTION SUBCUTANEOUS at 07:32

## 2019-04-21 RX ADMIN — HUMAN INSULIN SCH UNITS: 100 INJECTION, SOLUTION SUBCUTANEOUS at 19:00

## 2019-04-21 RX ADMIN — NOREPINEPHRINE BITARTRATE SCH MLS/HR: 1 INJECTION, SOLUTION, CONCENTRATE INTRAVENOUS at 13:38

## 2019-04-21 RX ADMIN — HUMAN INSULIN SCH UNITS: 100 INJECTION, SOLUTION SUBCUTANEOUS at 14:53

## 2019-04-21 RX ADMIN — Medication SCH STRIP: at 07:33

## 2019-04-21 RX ADMIN — LINEZOLID SCH MLS/HR: 600 INJECTION, SOLUTION INTRAVENOUS at 17:20

## 2019-04-21 RX ADMIN — Medication SCH STRIP: at 19:00

## 2019-04-21 RX ADMIN — CEFTRIAXONE SODIUM SCH MLS/HR: 1 INJECTION, POWDER, FOR SOLUTION INTRAMUSCULAR; INTRAVENOUS at 16:24

## 2019-04-21 RX ADMIN — NOREPINEPHRINE BITARTRATE SCH MLS/HR: 1 INJECTION, SOLUTION, CONCENTRATE INTRAVENOUS at 01:37

## 2019-04-21 RX ADMIN — Medication SCH STRIP: at 14:53

## 2019-04-21 NOTE — NUR
Dr. Robison call

Informed of status of patient, gtts, vitals, background, CT results. He states he will get 
in touch with admitting hospitalist. States to continue current plan of care and he will be 
in in the morning to assess the patient.

## 2019-04-21 NOTE — NUR
CALL RECEIVED FROM RADIOLOGY/HOSPITALIST NOTIFIED

CALL RECEIVED FROM GINA WITH [facility] IMAG[facility]. SPOKE WITH RADIOLOGIST DR WOOD CT 
FOUND LARGE AMOUNT OF FREE AIR AND A BOWEL PERFORATION - SITE OF PERFORATION NOT NOTED. 
HOSPITALIST NOTIFIED. STAT SURGICAL CONSULT ORDERED.

## 2019-04-21 NOTE — NUR
SECOND CALL TO RADIOLOGY

THIS NURSE ASKED TAWANA LOVING TO FOLLOW-UP WITH RADIOLOGY REGARDING TRANSFER FOR 
ABDOMINAL/PELVIC CT. STAE THEY WOULD BE IN SOON.

## 2019-04-21 NOTE — NUR
CONTACT FAMILY/MESSAGE LEFT FOR SPOUSE

CONTACT PHONE NUMBER NOTED IN PATIENT SPREADSHEET. SPOKE WITH PATIENT'S MOTHER ARACELI WHO 
NOTIFIED THIS NURSE TO "CALL HIS WIFE SANFORD, SHE IS THE ONE TO GIVE CONSENT FOR ANY 
PROCEDURES". MESSAGE LEFT FOR SANFORD [phone] FOR CENTRAL LINE PLACEMENT CONSENT. 
AWAITING RETURN CALL.

## 2019-04-21 NOTE — NUR
RETURN CALL FROM NEPHROLOGY

DR JOSEPH UPDATED ON PATIENT'S STATUS, LABS, IMAGING AND RECENT INTUBATION. ORDERS RECEIVED. 

-------------------------------------------------------------------------------

Addendum: 04/21/19 at 1844 by Deena De Santiago RN

-------------------------------------------------------------------------------

DR JOSEPH ALSO NOTIFIED OF TOTAL URINE OUTPUT 25 MLS FOR AM SHIFT.

## 2019-04-21 NOTE — NUR
NO ECHO TO PERFORMED/HOSPITALIST AWARE

PER TECH TECH AS PATIENT HAD ONE DONE IN JANUARY AND ORDERS MUST BE OBTAINED FROM 
CARDIOLOGIST. DR WHITLEY AWARE AND VERBALIZED NEED FOR ECHO AS THERE IS A STATUS CHANGE.

## 2019-04-21 NOTE — NUR
RETURN TO ICU

FROM CT SCAN. PATIENT CONTINUES TO BE ALERT AND ORIENTED X2, NO DISTRESS NOTED RESPIRATIONS 
EVEN AND UNLABORED. PATIENT CONNECTED TO BEDSIDE MONITOR, VSS AND DOCUMENTED. COMFORT 
MEASURES PROVIDED - COMPLETE BEDDING CHANGED.

## 2019-04-21 NOTE — NUR
PAGED NEPHROLOGY

DR JOSEPH TO UPDATE ON RECENT LABS AND URINE OUTPUT. MESSAGE LEFT WITH ANSWERING SERVICE, 
AWAITING RESPONSE.

## 2019-04-21 NOTE — NUR
Hospitalist call

Informed the hospitalist, ERNESTO Tobar, of current condition of patient including gtts, labs, 
vitals and status. Informed that RFA appears to have infiltration with dark purple 
discoloration and mild swelling. He states the RT read him the ABG results. He ordered:

-D/C NS 100ml/hour

-Start patient on 1/2 NS with 1 amp of sodium bicarb at 100ml/hour continuous 

-administer an additional amp of bicarb IV push (this is in addition to the two amps ordered 
for hyperkalemia treatment)

-RT to administer Albuterol 20mg med neb for hyperkalemia treatment

-Ok to put Nitro-bid paste on RFA TD x1



RN performed TORB and NP verified orders to be correct. No additional orders received.

## 2019-04-21 NOTE — NUR
PATIENT OFF FLOOR TO CT

VIA GURNEY, ACCOMPANIED BY RADIOLOGY TECH AND THIS NURSE. PATIENT ALERT AND ORIENTED X2, NO 
DISTRESS NOTED, RESPIRATIONS EVEN AND UNLABORED, VSS - PATIENT TACHYPNEIC AND SATURATION 98 
ON 4 LITERS NASAL CANNULA.

## 2019-04-21 NOTE — NUR
Cooling measures

Rectal temperature 99.9F. Cooling measures applied with protective cloth in between skin and 
ice packs to maintain skin integrity. Patient tolerating well with no shivering or 
discomfort noted.

## 2019-04-21 NOTE — NUR
Family updated on pt status

Family of SHANTA GERARDO updated on patient's status and condition. All questions and 
concerns addressed. Karen, patient's spouse verbalized understanding [phone].

## 2019-04-21 NOTE — NUR
CONTACT PATIENT'S SPOUSE 

RETURN CALL FROM SANFORD, SPOUSE. PLAN OF CARE WAS DISCUSSED WITH SANFORD AND PHYSICIAN'S 
ORDER FOR CENTRAL LINE PLACEMENT. SANFORD VERBALIZED VIA TELEPHONE "WHATEVER YOU NEED TO DO 
TO HELP HIM". CONSENT VERIFIED BY BHARGAV, CHARGE NURSE. SANFORD ALSO NOTIFIED THIS NURSE THAT 
PATIENT HAS A HISTORY OF DIABETES FOR WHICH HE TAKES INSULIN - SHE ALSO STATED THAT HE MAY 
HAVE BEEN GIVEN SLEEPING PILLS BUT COULD NOT CONFIRM THAT. SHE ALSO STATED HE HAS HAD A 
HISTORY OF PANCREATITIS IN THE PAST, DENIED DRUG ABUSE BUT CONFIRMED ETOH ABUSE FOR "MANY 
YEARS". SHE ALSO STATED HE HAS HAD A HISTORY OF SEIZURES WHICH BEGAN IN 2012 ON AND OFF - 
LAST SEIZURE OCCURRED ABOUT 6-9 MONTHS AGO, ONCE AGAIN NOT TOO SURE OF LAST SEIZURE - 
HYPOTENSION, BACK SURGERY, PCP IS DR GUEVARA. WIFE LAST SAW PATIENT ON "THURSDAY, COMPLAINING 
OF BACK PAIN/STOMACH AND WHOLE BODY ACHED".

## 2019-04-21 NOTE — NUR
FAMILY/UPDATE

CALLED AND SPOKE WITH PT'S WIFE, SANFORD, BY PHONE , AND NOTIFIED OF CHANGE IN PT'S 
CONDITION NECESSITATING NEED FOR INTUBATION AND OF ABNORMAL CT ABD RESULTS AND THAT A STAT 
SURGICAL HAS BEEN CALLED. SHE IS ON HER WAY AND I TOLD HER WE WILL HAVE THE SURGEON CALL HER 
IF HE ARRIVES BEFORE SHE DOES.

## 2019-04-21 NOTE — NUR
Initial Assessment

Patient received laying on bed on mechanical ventilation and sedation with no s/s of 
distress or pain noted. ETT secured with Judi, Ambu bag at bedside, oral care and 
suction rendered. HOB elevated to 30 degrees for VAP/aspiration precautions. PERRL intact 
but sluggish. RIJ central line intact and patent with no s/s of infiltration or phlebitis 
noted-dressing CDI. Surrounding tissue to RFA PIV is ecchymotic and puffy, Will notify MD. 
SAVANAH IV intact and patent with no s/s of infiltration or phlebitis.  Abd is soft but 
distended with very hypoactive bowel sounds. F/C intact and draining dark carlene urine to 
gravity. Generalized body is mottled and cool to touch but all distal pulses are palpable.  
All extremities elevated. Bed in lowest position, side rails up, bed brakes set, all alarms 
audible, in direct view of the nurses station. Continue close monitoring.

## 2019-04-21 NOTE — NUR
CALL RECEIVED FROM POISON CONTROL

CHRISTINA UPDATED ON PATIENT'S STATUS, LABS AND IMAGES. [phone] - CASE NUMBER 
[id number].

## 2019-04-21 NOTE — NUR
House Supervisor call

informed house supervisor of possible pending surgery with Dr. Robison and that patient's 
wife was wondering about time. She states they will not know until at least after 0630 when 
OR  arrives.

## 2019-04-21 NOTE — NUR
Pt being admitted to ICU

SHANTA GERARDO admitted to ICU via gurney on cardiac monitor, and portable 02. Patient 
transferred to bed, connected to ICU monitoring and oxygen, and weighed by [facility]. Patient 
oriented to Deena De Santiago, primary RN, unit, room, bed, and unit policies regarding patient 
care and visiting hours. Patient alert and oriented to person only with episodes of 
alertness to situation, this nurse able to converse on/off appropriately with patient. 
Patient heart rate increased, ekg order received and will be performed. Patient currently on 
15 liters via non rebreather with inability to obtain continuous oxygen saturations due to 
extremities being cold. Patient currently sating 100% after being placed on portable oxygen 
monitoring. Patient temperature also decreased 96.5 - rectal temp will be checked for 
accuracy. All questions and concerns addressed, patient verbalized understanding. Fall 
precautions in place. Call light within reach.

-------------------------------------------------------------------------------

Addendum: 04/25/19 at 1607 by Deena De Santiago RN

-------------------------------------------------------------------------------

PATIENT ON SUICIDE PRECAUTIONS - NO SITTER AT BEDSIDE UPON ADMISSION. BHARGAV CHARGE NURSE 
AWARE AND NOTIFIED THIS NURSE THAT "HOUSE SUPERVISOR AWARE AND STATED HE DOES NOT NEED A 
SITTER". FALL, SAFETY AND SUICIDE PRECAUTIONS WILL BE INITIATED BY THIS NURSE.

## 2019-04-21 NOTE — NUR
Poison Control call

Poison control rep called and got update on patient. She states we need to add AST/ALT to 
next chem panel to see if LFTs are rising.

## 2019-04-21 NOTE — NUR
STATUS CHANGE

AFTER PROVIDING COMFORT MEASURES, PATIENT BECAME NON RESPONSIVE WHEN THIS NURSE CALLED HIS 
NAME, APPEARED TO BE IN RESPIRATORY DISTRESS. HOSPITALIST IN ICU UNIT AND NOTIFIED. ASSESSED 
THE NEED FOR INTUBATION.

## 2019-04-21 NOTE — NUR
IV discontinued

RFA IV discontinued and pressure dressing applied. Nitro-bid applied to site and arm 
elevated. Neurovascular status remains intact and no worsening of discoloration noted.

## 2019-04-21 NOTE — NUR
CENTRAL LINE READJUSTED/OK TO USE

LEILA DILLON NP AT BEDSIDE, READJUSTED CENTRAL LINE AND VERIFIED CXR, OK TO USE PER LEILA.

## 2019-04-21 NOTE — NUR
CONTACT HOSPITALIST

DR WHITLEY NOTIFIED OF ECG RESULTS. ORDERS RECEIVED AND STATED HE WOULD BE AT BEDSIDE SOON.

## 2019-04-21 NOTE — NUR
ECHO TECH AT BEDSIDE

-------------------------------------------------------------------------------

Addendum: 04/21/19 at 1117 by Deena De Santiago RN

-------------------------------------------------------------------------------

BEDSIDE OXYGEN MONITOR NOT FUNCTIONING, PORTABLE OXYGEN PLACED AT BEDSIDE BY ANGELITA CURRENT 
READING 100% ON 15 LITERS NON-REBREATHER. R.T. DECREASED OXYGEN TO 10 LITERS. WILL MONITOR 
AND DECREASE OXYGEN CONSUMPTION BASED ON O2 SATURATIONS.

## 2019-04-21 NOTE — NUR
CALL RECEIVED FROM NEPHROLOGY

PATIENT NOT YET RECEIVED IN ICU - DR JOSEPH UPDATED ON CURRENT LABS, DRIPS, REASON FOR 
CONSULT AND PENDING PATIENT ADMITTANCE TO ICU. ORDERS RECEIVED AND WILL BE CARRIED OUT ONCE 
PATIENT ARRIVES. DR JOSEPH ALSO ORDERED 3% SODIUM DRIP TO BE DISCONTINUED NOW AND TO NOTIFY 
ER. CONTACT ER TO NOTIFY EVERTON RINALDI REGISTERED NURSE.

## 2019-04-21 NOTE — NUR
Family at bedside

Patient's wife and three Sons are at bedside. RN updated on status and POC and they 
verbalized understanding. Informed also that Dr. Robison will be rounding in the morning to 
determine if the patient needs to go to surgery and they verbalized understanding. No 
concerns or complaints voiced from them at this time. It was agreed that the main contact 
and consent signer is to be Karen, the patient's wife. She set up password of "Cara".

## 2019-04-21 NOTE — NUR
CRITICAL/END OF SHIFT NOTE

PHONE CALL RECEIVED FROM LABORATORY REGARDING CURRENT POTASSIUM LEVEL. ORDERS RECEIVED FROM 
DR JOSEPH AND ENDORSED TO NIGHT SHIFT RN. NIGHT SHIFT RN AWARE TO NOTIFY DR JOSEPH OF 1100 PM 
LABS AFTER PREVIOUS ORDERS FOR HYPERKALEMIA ARE ADMINISTERED. PATIENT MECHANICALLY 
INTUBATED, VS STABLE, NO DISTRESS NOTED. 

-------------------------------------------------------------------------------

Addendum: 04/21/19 at 2131 by Deena De Santiago RN

-------------------------------------------------------------------------------

THROUGHOUT AM SHIFT, THIS NURSE, CHARGE NURSE AND RESPIRATORY THERAPIST WAS UNABLE TO GET 
OXYGEN SATURATION READINGS AT BEDSIDE. RCHANDU PLACED A PORTABLE OXYGEN MONITORING SYSTEM AND 
OXYGEN SATURATION RANGED BETWEEN 92% THROUGH 100%, THEREFORE, OXYGEN SATURATION IS NOT NOTED 
IN INTERVENTION VITAL SIGN SECTION.

## 2019-04-22 VITALS — SYSTOLIC BLOOD PRESSURE: 96 MMHG | DIASTOLIC BLOOD PRESSURE: 57 MMHG

## 2019-04-22 VITALS — DIASTOLIC BLOOD PRESSURE: 50 MMHG | SYSTOLIC BLOOD PRESSURE: 118 MMHG

## 2019-04-22 VITALS — SYSTOLIC BLOOD PRESSURE: 93 MMHG | DIASTOLIC BLOOD PRESSURE: 43 MMHG

## 2019-04-22 VITALS — SYSTOLIC BLOOD PRESSURE: 85 MMHG | DIASTOLIC BLOOD PRESSURE: 48 MMHG

## 2019-04-22 VITALS — DIASTOLIC BLOOD PRESSURE: 35 MMHG | SYSTOLIC BLOOD PRESSURE: 110 MMHG

## 2019-04-22 VITALS — SYSTOLIC BLOOD PRESSURE: 97 MMHG | DIASTOLIC BLOOD PRESSURE: 46 MMHG

## 2019-04-22 VITALS — DIASTOLIC BLOOD PRESSURE: 40 MMHG | SYSTOLIC BLOOD PRESSURE: 117 MMHG

## 2019-04-22 VITALS — DIASTOLIC BLOOD PRESSURE: 34 MMHG | SYSTOLIC BLOOD PRESSURE: 64 MMHG

## 2019-04-22 VITALS — DIASTOLIC BLOOD PRESSURE: 63 MMHG | SYSTOLIC BLOOD PRESSURE: 100 MMHG

## 2019-04-22 VITALS — DIASTOLIC BLOOD PRESSURE: 50 MMHG | SYSTOLIC BLOOD PRESSURE: 109 MMHG

## 2019-04-22 VITALS — SYSTOLIC BLOOD PRESSURE: 99 MMHG | DIASTOLIC BLOOD PRESSURE: 65 MMHG

## 2019-04-22 VITALS — SYSTOLIC BLOOD PRESSURE: 117 MMHG | DIASTOLIC BLOOD PRESSURE: 40 MMHG

## 2019-04-22 VITALS — SYSTOLIC BLOOD PRESSURE: 109 MMHG | DIASTOLIC BLOOD PRESSURE: 54 MMHG

## 2019-04-22 VITALS — SYSTOLIC BLOOD PRESSURE: 106 MMHG | DIASTOLIC BLOOD PRESSURE: 40 MMHG

## 2019-04-22 VITALS — SYSTOLIC BLOOD PRESSURE: 113 MMHG | DIASTOLIC BLOOD PRESSURE: 40 MMHG

## 2019-04-22 VITALS — DIASTOLIC BLOOD PRESSURE: 66 MMHG | SYSTOLIC BLOOD PRESSURE: 112 MMHG

## 2019-04-22 VITALS — DIASTOLIC BLOOD PRESSURE: 67 MMHG | SYSTOLIC BLOOD PRESSURE: 109 MMHG

## 2019-04-22 VITALS — SYSTOLIC BLOOD PRESSURE: 105 MMHG | DIASTOLIC BLOOD PRESSURE: 29 MMHG

## 2019-04-22 VITALS — DIASTOLIC BLOOD PRESSURE: 38 MMHG | SYSTOLIC BLOOD PRESSURE: 106 MMHG

## 2019-04-22 VITALS — SYSTOLIC BLOOD PRESSURE: 86 MMHG | DIASTOLIC BLOOD PRESSURE: 45 MMHG

## 2019-04-22 VITALS — SYSTOLIC BLOOD PRESSURE: 101 MMHG | DIASTOLIC BLOOD PRESSURE: 50 MMHG

## 2019-04-22 VITALS — DIASTOLIC BLOOD PRESSURE: 51 MMHG | SYSTOLIC BLOOD PRESSURE: 87 MMHG

## 2019-04-22 VITALS — DIASTOLIC BLOOD PRESSURE: 29 MMHG | SYSTOLIC BLOOD PRESSURE: 105 MMHG

## 2019-04-22 VITALS — SYSTOLIC BLOOD PRESSURE: 86 MMHG | DIASTOLIC BLOOD PRESSURE: 50 MMHG

## 2019-04-22 VITALS — DIASTOLIC BLOOD PRESSURE: 54 MMHG | SYSTOLIC BLOOD PRESSURE: 85 MMHG

## 2019-04-22 VITALS — DIASTOLIC BLOOD PRESSURE: 31 MMHG | SYSTOLIC BLOOD PRESSURE: 110 MMHG

## 2019-04-22 VITALS — SYSTOLIC BLOOD PRESSURE: 101 MMHG | DIASTOLIC BLOOD PRESSURE: 28 MMHG

## 2019-04-22 VITALS — SYSTOLIC BLOOD PRESSURE: 83 MMHG | DIASTOLIC BLOOD PRESSURE: 44 MMHG

## 2019-04-22 VITALS — DIASTOLIC BLOOD PRESSURE: 37 MMHG | SYSTOLIC BLOOD PRESSURE: 98 MMHG

## 2019-04-22 VITALS — SYSTOLIC BLOOD PRESSURE: 98 MMHG | DIASTOLIC BLOOD PRESSURE: 50 MMHG

## 2019-04-22 VITALS — SYSTOLIC BLOOD PRESSURE: 90 MMHG | DIASTOLIC BLOOD PRESSURE: 53 MMHG

## 2019-04-22 VITALS — DIASTOLIC BLOOD PRESSURE: 48 MMHG | SYSTOLIC BLOOD PRESSURE: 89 MMHG

## 2019-04-22 VITALS — DIASTOLIC BLOOD PRESSURE: 26 MMHG | SYSTOLIC BLOOD PRESSURE: 95 MMHG

## 2019-04-22 VITALS — DIASTOLIC BLOOD PRESSURE: 57 MMHG | SYSTOLIC BLOOD PRESSURE: 98 MMHG

## 2019-04-22 VITALS — DIASTOLIC BLOOD PRESSURE: 35 MMHG | SYSTOLIC BLOOD PRESSURE: 108 MMHG

## 2019-04-22 VITALS — DIASTOLIC BLOOD PRESSURE: 33 MMHG | SYSTOLIC BLOOD PRESSURE: 110 MMHG

## 2019-04-22 VITALS — DIASTOLIC BLOOD PRESSURE: 45 MMHG | SYSTOLIC BLOOD PRESSURE: 94 MMHG

## 2019-04-22 VITALS — SYSTOLIC BLOOD PRESSURE: 87 MMHG | DIASTOLIC BLOOD PRESSURE: 60 MMHG

## 2019-04-22 VITALS — DIASTOLIC BLOOD PRESSURE: 46 MMHG | SYSTOLIC BLOOD PRESSURE: 97 MMHG

## 2019-04-22 VITALS — DIASTOLIC BLOOD PRESSURE: 61 MMHG | SYSTOLIC BLOOD PRESSURE: 119 MMHG

## 2019-04-22 VITALS — SYSTOLIC BLOOD PRESSURE: 94 MMHG | DIASTOLIC BLOOD PRESSURE: 30 MMHG

## 2019-04-22 VITALS — DIASTOLIC BLOOD PRESSURE: 50 MMHG | SYSTOLIC BLOOD PRESSURE: 105 MMHG

## 2019-04-22 VITALS — SYSTOLIC BLOOD PRESSURE: 116 MMHG | DIASTOLIC BLOOD PRESSURE: 42 MMHG

## 2019-04-22 VITALS — SYSTOLIC BLOOD PRESSURE: 64 MMHG | DIASTOLIC BLOOD PRESSURE: 34 MMHG

## 2019-04-22 VITALS — SYSTOLIC BLOOD PRESSURE: 112 MMHG | DIASTOLIC BLOOD PRESSURE: 43 MMHG

## 2019-04-22 VITALS — DIASTOLIC BLOOD PRESSURE: 30 MMHG | SYSTOLIC BLOOD PRESSURE: 94 MMHG

## 2019-04-22 VITALS — DIASTOLIC BLOOD PRESSURE: 41 MMHG | SYSTOLIC BLOOD PRESSURE: 81 MMHG

## 2019-04-22 VITALS — DIASTOLIC BLOOD PRESSURE: 59 MMHG | SYSTOLIC BLOOD PRESSURE: 116 MMHG

## 2019-04-22 VITALS — DIASTOLIC BLOOD PRESSURE: 61 MMHG | SYSTOLIC BLOOD PRESSURE: 95 MMHG

## 2019-04-22 VITALS — DIASTOLIC BLOOD PRESSURE: 41 MMHG | SYSTOLIC BLOOD PRESSURE: 101 MMHG

## 2019-04-22 VITALS — SYSTOLIC BLOOD PRESSURE: 80 MMHG | DIASTOLIC BLOOD PRESSURE: 52 MMHG

## 2019-04-22 VITALS — SYSTOLIC BLOOD PRESSURE: 96 MMHG | DIASTOLIC BLOOD PRESSURE: 50 MMHG

## 2019-04-22 VITALS — DIASTOLIC BLOOD PRESSURE: 63 MMHG | SYSTOLIC BLOOD PRESSURE: 98 MMHG

## 2019-04-22 VITALS — DIASTOLIC BLOOD PRESSURE: 45 MMHG | SYSTOLIC BLOOD PRESSURE: 105 MMHG

## 2019-04-22 VITALS — DIASTOLIC BLOOD PRESSURE: 54 MMHG | SYSTOLIC BLOOD PRESSURE: 102 MMHG

## 2019-04-22 VITALS — SYSTOLIC BLOOD PRESSURE: 116 MMHG | DIASTOLIC BLOOD PRESSURE: 35 MMHG

## 2019-04-22 VITALS — DIASTOLIC BLOOD PRESSURE: 40 MMHG | SYSTOLIC BLOOD PRESSURE: 106 MMHG

## 2019-04-22 VITALS — DIASTOLIC BLOOD PRESSURE: 47 MMHG | SYSTOLIC BLOOD PRESSURE: 113 MMHG

## 2019-04-22 VITALS — DIASTOLIC BLOOD PRESSURE: 62 MMHG | SYSTOLIC BLOOD PRESSURE: 104 MMHG

## 2019-04-22 VITALS — SYSTOLIC BLOOD PRESSURE: 118 MMHG | DIASTOLIC BLOOD PRESSURE: 33 MMHG

## 2019-04-22 VITALS — SYSTOLIC BLOOD PRESSURE: 96 MMHG | DIASTOLIC BLOOD PRESSURE: 56 MMHG

## 2019-04-22 VITALS — SYSTOLIC BLOOD PRESSURE: 103 MMHG | DIASTOLIC BLOOD PRESSURE: 39 MMHG

## 2019-04-22 VITALS — SYSTOLIC BLOOD PRESSURE: 94 MMHG | DIASTOLIC BLOOD PRESSURE: 75 MMHG

## 2019-04-22 VITALS — DIASTOLIC BLOOD PRESSURE: 37 MMHG | SYSTOLIC BLOOD PRESSURE: 112 MMHG

## 2019-04-22 VITALS — DIASTOLIC BLOOD PRESSURE: 74 MMHG | SYSTOLIC BLOOD PRESSURE: 141 MMHG

## 2019-04-22 VITALS — SYSTOLIC BLOOD PRESSURE: 102 MMHG | DIASTOLIC BLOOD PRESSURE: 54 MMHG

## 2019-04-22 VITALS — SYSTOLIC BLOOD PRESSURE: 105 MMHG | DIASTOLIC BLOOD PRESSURE: 36 MMHG

## 2019-04-22 VITALS — DIASTOLIC BLOOD PRESSURE: 35 MMHG | SYSTOLIC BLOOD PRESSURE: 104 MMHG

## 2019-04-22 VITALS — SYSTOLIC BLOOD PRESSURE: 79 MMHG | DIASTOLIC BLOOD PRESSURE: 49 MMHG

## 2019-04-22 VITALS — DIASTOLIC BLOOD PRESSURE: 40 MMHG | SYSTOLIC BLOOD PRESSURE: 114 MMHG

## 2019-04-22 VITALS — DIASTOLIC BLOOD PRESSURE: 47 MMHG | SYSTOLIC BLOOD PRESSURE: 92 MMHG

## 2019-04-22 VITALS — SYSTOLIC BLOOD PRESSURE: 101 MMHG | DIASTOLIC BLOOD PRESSURE: 32 MMHG

## 2019-04-22 VITALS — DIASTOLIC BLOOD PRESSURE: 56 MMHG | SYSTOLIC BLOOD PRESSURE: 107 MMHG

## 2019-04-22 VITALS — DIASTOLIC BLOOD PRESSURE: 38 MMHG | SYSTOLIC BLOOD PRESSURE: 110 MMHG

## 2019-04-22 VITALS — SYSTOLIC BLOOD PRESSURE: 91 MMHG | DIASTOLIC BLOOD PRESSURE: 46 MMHG

## 2019-04-22 VITALS — SYSTOLIC BLOOD PRESSURE: 94 MMHG | DIASTOLIC BLOOD PRESSURE: 45 MMHG

## 2019-04-22 VITALS — SYSTOLIC BLOOD PRESSURE: 141 MMHG | DIASTOLIC BLOOD PRESSURE: 66 MMHG

## 2019-04-22 VITALS — SYSTOLIC BLOOD PRESSURE: 120 MMHG | DIASTOLIC BLOOD PRESSURE: 42 MMHG

## 2019-04-22 VITALS — SYSTOLIC BLOOD PRESSURE: 61 MMHG | DIASTOLIC BLOOD PRESSURE: 38 MMHG

## 2019-04-22 VITALS — SYSTOLIC BLOOD PRESSURE: 114 MMHG | DIASTOLIC BLOOD PRESSURE: 35 MMHG

## 2019-04-22 VITALS — DIASTOLIC BLOOD PRESSURE: 59 MMHG | SYSTOLIC BLOOD PRESSURE: 98 MMHG

## 2019-04-22 VITALS — SYSTOLIC BLOOD PRESSURE: 86 MMHG | DIASTOLIC BLOOD PRESSURE: 47 MMHG

## 2019-04-22 VITALS — SYSTOLIC BLOOD PRESSURE: 113 MMHG | DIASTOLIC BLOOD PRESSURE: 36 MMHG

## 2019-04-22 VITALS — SYSTOLIC BLOOD PRESSURE: 112 MMHG | DIASTOLIC BLOOD PRESSURE: 32 MMHG

## 2019-04-22 VITALS — DIASTOLIC BLOOD PRESSURE: 69 MMHG | SYSTOLIC BLOOD PRESSURE: 130 MMHG

## 2019-04-22 VITALS — DIASTOLIC BLOOD PRESSURE: 46 MMHG | SYSTOLIC BLOOD PRESSURE: 118 MMHG

## 2019-04-22 VITALS — SYSTOLIC BLOOD PRESSURE: 121 MMHG | DIASTOLIC BLOOD PRESSURE: 41 MMHG

## 2019-04-22 VITALS — SYSTOLIC BLOOD PRESSURE: 110 MMHG | DIASTOLIC BLOOD PRESSURE: 39 MMHG

## 2019-04-22 VITALS — DIASTOLIC BLOOD PRESSURE: 51 MMHG | SYSTOLIC BLOOD PRESSURE: 104 MMHG

## 2019-04-22 VITALS — SYSTOLIC BLOOD PRESSURE: 109 MMHG | DIASTOLIC BLOOD PRESSURE: 35 MMHG

## 2019-04-22 VITALS — DIASTOLIC BLOOD PRESSURE: 33 MMHG | SYSTOLIC BLOOD PRESSURE: 118 MMHG

## 2019-04-22 VITALS — SYSTOLIC BLOOD PRESSURE: 108 MMHG | DIASTOLIC BLOOD PRESSURE: 48 MMHG

## 2019-04-22 VITALS — DIASTOLIC BLOOD PRESSURE: 25 MMHG | SYSTOLIC BLOOD PRESSURE: 89 MMHG

## 2019-04-22 VITALS — SYSTOLIC BLOOD PRESSURE: 116 MMHG | DIASTOLIC BLOOD PRESSURE: 82 MMHG

## 2019-04-22 VITALS — DIASTOLIC BLOOD PRESSURE: 49 MMHG | SYSTOLIC BLOOD PRESSURE: 89 MMHG

## 2019-04-22 VITALS — DIASTOLIC BLOOD PRESSURE: 52 MMHG | SYSTOLIC BLOOD PRESSURE: 105 MMHG

## 2019-04-22 VITALS — DIASTOLIC BLOOD PRESSURE: 47 MMHG | SYSTOLIC BLOOD PRESSURE: 75 MMHG

## 2019-04-22 VITALS — DIASTOLIC BLOOD PRESSURE: 41 MMHG | SYSTOLIC BLOOD PRESSURE: 97 MMHG

## 2019-04-22 VITALS — DIASTOLIC BLOOD PRESSURE: 58 MMHG | SYSTOLIC BLOOD PRESSURE: 109 MMHG

## 2019-04-22 VITALS — DIASTOLIC BLOOD PRESSURE: 46 MMHG | SYSTOLIC BLOOD PRESSURE: 95 MMHG

## 2019-04-22 VITALS — SYSTOLIC BLOOD PRESSURE: 90 MMHG | DIASTOLIC BLOOD PRESSURE: 66 MMHG

## 2019-04-22 VITALS — SYSTOLIC BLOOD PRESSURE: 100 MMHG | DIASTOLIC BLOOD PRESSURE: 60 MMHG

## 2019-04-22 VITALS — SYSTOLIC BLOOD PRESSURE: 110 MMHG | DIASTOLIC BLOOD PRESSURE: 42 MMHG

## 2019-04-22 LAB
ALBUMIN SERPL-MCNC: 1.4 G/DL (ref 3.4–5)
ALBUMIN SERPL-MCNC: 1.6 G/DL (ref 3.4–5)
ALP SERPL-CCNC: 35 U/L (ref 45–117)
ALP SERPL-CCNC: 36 U/L (ref 45–117)
ALT SERPL-CCNC: 30 U/L (ref 16–61)
ALT SERPL-CCNC: 33 U/L (ref 16–61)
ANION GAP SERPL CALCULATED.3IONS-SCNC: 18 MMOL/L (ref 5–15)
ANION GAP SERPL CALCULATED.3IONS-SCNC: 19 MMOL/L (ref 5–15)
ANION GAP SERPL CALCULATED.3IONS-SCNC: 20 MMOL/L (ref 5–15)
BILIRUB SERPL-MCNC: 0.3 MG/DL (ref 0.2–1)
BILIRUB SERPL-MCNC: 0.3 MG/DL (ref 0.2–1)
BUN SERPL-MCNC: 64 MG/DL (ref 7–18)
BUN SERPL-MCNC: 64 MG/DL (ref 7–18)
BUN SERPL-MCNC: 65 MG/DL (ref 7–18)
BUN/CREAT SERPL: 12.2
BUN/CREAT SERPL: 12.4
BUN/CREAT SERPL: 12.4
CALCIUM SERPL-MCNC: 7.2 MG/DL (ref 8.5–10.1)
CALCIUM SERPL-MCNC: 7.3 MG/DL (ref 8.5–10.1)
CALCIUM SERPL-MCNC: 7.7 MG/DL (ref 8.5–10.1)
CHLORIDE SERPL-SCNC: 91 MMOL/L (ref 98–107)
CHLORIDE SERPL-SCNC: 93 MMOL/L (ref 98–107)
CHLORIDE SERPL-SCNC: 94 MMOL/L (ref 98–107)
CO2 SERPL-SCNC: 18 MMOL/L (ref 21–32)
GLUCOSE SERPL-MCNC: 219 MG/DL (ref 74–106)
GLUCOSE SERPL-MCNC: 262 MG/DL (ref 74–106)
GLUCOSE SERPL-MCNC: 75 MG/DL (ref 74–106)
HCT VFR BLD AUTO: 31.4 % (ref 41–53)
HCT VFR BLD AUTO: 33.3 % (ref 41–53)
HGB BLD-MCNC: 10.8 G/DL (ref 13.5–17.5)
HGB BLD-MCNC: 11.5 G/DL (ref 13.5–17.5)
MCH RBC QN AUTO: 31.8 PG (ref 28–32)
MCH RBC QN AUTO: 32.1 PG (ref 28–32)
MCV RBC AUTO: 91.9 FL (ref 80–100)
MCV RBC AUTO: 92.9 FL (ref 80–100)
POTASSIUM SERPL-SCNC: 4.5 MMOL/L (ref 3.5–5.1)
POTASSIUM SERPL-SCNC: 4.8 MMOL/L (ref 3.5–5.1)
POTASSIUM SERPL-SCNC: 5.4 MMOL/L (ref 3.5–5.1)
PROT SERPL-MCNC: 4.6 G/DL (ref 6.4–8.2)
PROT SERPL-MCNC: 4.9 G/DL (ref 6.4–8.2)
SODIUM SERPL-SCNC: 129 MMOL/L (ref 136–145)
SODIUM SERPL-SCNC: 129 MMOL/L (ref 136–145)
SODIUM SERPL-SCNC: 131 MMOL/L (ref 136–145)
URATE SERPL-MCNC: 9 MG/DL (ref 3.5–7.2)

## 2019-04-22 PROCEDURE — 30233R1 TRANSFUSION OF NONAUTOLOGOUS PLATELETS INTO PERIPHERAL VEIN, PERCUTANEOUS APPROACH: ICD-10-PCS | Performed by: SURGERY

## 2019-04-22 RX ADMIN — PROPOFOL SCH MLS/HR: 10 INJECTION, EMULSION INTRAVENOUS at 08:53

## 2019-04-22 RX ADMIN — SODIUM CHLORIDE SCH MLS/HR: 0.9 INJECTION, SOLUTION INTRAVENOUS at 02:46

## 2019-04-22 RX ADMIN — CEFTRIAXONE SODIUM SCH MLS/HR: 1 INJECTION, POWDER, FOR SOLUTION INTRAMUSCULAR; INTRAVENOUS at 08:53

## 2019-04-22 RX ADMIN — PROPOFOL SCH MLS/HR: 10 INJECTION, EMULSION INTRAVENOUS at 23:00

## 2019-04-22 RX ADMIN — DEXTROSE SCH MLS/HR: 5 SOLUTION INTRAVENOUS at 07:30

## 2019-04-22 RX ADMIN — SODIUM CHLORIDE SCH MLS/HR: 0.9 INJECTION, SOLUTION INTRAVENOUS at 21:29

## 2019-04-22 RX ADMIN — PROPOFOL SCH MLS/HR: 10 INJECTION, EMULSION INTRAVENOUS at 17:23

## 2019-04-22 RX ADMIN — SODIUM CHLORIDE SCH MLS/HR: 0.9 INJECTION, SOLUTION INTRAVENOUS at 11:06

## 2019-04-22 RX ADMIN — SODIUM CHLORIDE SCH MLS/HR: 0.9 INJECTION, SOLUTION INTRAVENOUS at 16:45

## 2019-04-22 RX ADMIN — HUMAN INSULIN SCH UNITS: 100 INJECTION, SOLUTION SUBCUTANEOUS at 22:53

## 2019-04-22 RX ADMIN — AMIODARONE HYDROCHLORIDE SCH MLS/HR: 50 INJECTION, SOLUTION INTRAVENOUS at 14:32

## 2019-04-22 RX ADMIN — LINEZOLID SCH MLS/HR: 600 INJECTION, SOLUTION INTRAVENOUS at 09:22

## 2019-04-22 RX ADMIN — SODIUM CHLORIDE SCH MLS/HR: 0.9 INJECTION, SOLUTION INTRAVENOUS at 06:45

## 2019-04-22 RX ADMIN — HUMAN INSULIN SCH UNITS: 100 INJECTION, SOLUTION SUBCUTANEOUS at 06:32

## 2019-04-22 RX ADMIN — HUMAN INSULIN SCH UNITS: 100 INJECTION, SOLUTION SUBCUTANEOUS at 11:30

## 2019-04-22 RX ADMIN — DEXTROSE SCH MLS/HR: 5 SOLUTION INTRAVENOUS at 18:00

## 2019-04-22 RX ADMIN — MIDAZOLAM SCH MLS/HR: 5 INJECTION, SOLUTION INTRAMUSCULAR; INTRAVENOUS at 21:00

## 2019-04-22 RX ADMIN — Medication SCH STRIP: at 22:52

## 2019-04-22 RX ADMIN — HUMAN INSULIN SCH UNITS: 100 INJECTION, SOLUTION SUBCUTANEOUS at 17:00

## 2019-04-22 RX ADMIN — NOREPINEPHRINE BITARTRATE SCH MLS/HR: 1 INJECTION, SOLUTION, CONCENTRATE INTRAVENOUS at 08:19

## 2019-04-22 RX ADMIN — PANTOPRAZOLE SODIUM SCH MG: 40 INJECTION, POWDER, FOR SOLUTION INTRAVENOUS at 10:02

## 2019-04-22 RX ADMIN — Medication SCH STRIP: at 06:32

## 2019-04-22 RX ADMIN — Medication SCH STRIP: at 17:19

## 2019-04-22 RX ADMIN — Medication SCH STRIP: at 11:30

## 2019-04-22 RX ADMIN — NOREPINEPHRINE BITARTRATE SCH MLS/HR: 1 INJECTION, SOLUTION, CONCENTRATE INTRAVENOUS at 13:23

## 2019-04-22 NOTE — NUR
Report given

No changes or incidents to report, no s/s of distress or pain. Care endorsed to day shift 
RN.

## 2019-04-22 NOTE — NUR
HYPOTENSION



Patient blood pressure trending 60-70 systolic. Sedation decreased and levophed gtt 
increased. Assess BP on left upper arm, results lower than right calf. BP cuff returned to 
right calf. Will continue to monitor. No other signs of distress noted at this time.

## 2019-04-22 NOTE — NUR
TEMPERATURE



Patient running 99.9 rectal temp. Fan placed toward patient, blankets removed, and ice packs 
applied to trunk. Will continue to monitor.

## 2019-04-22 NOTE — NUR
FAMILY



Patient wife at bedside. She is aware surgery has been postponed until tomorrow. All 
questions addressed.

## 2019-04-22 NOTE — NUR
Surgery call

Operating RN called and states patient may go at 0830 or after the 0830 case depending on 
Dr. Robison's assessment.

## 2019-04-22 NOTE — NUR
RT NOTE



VENT CHECK COMPLETED. PT TOLERATING SETTINGS WELL. NO CHANGES MADE AT THIS TIME. 2000 ABG 
NOT ABLE TO BE DRAWN AT THIS TIME. RT TRIED BUT WAS NOT ABLE TO COLLECT DUE TO LOW BP. RN 
ALEX ASKED THAT RT WAIT UNTIL BP COMES BACK UP.

## 2019-04-22 NOTE — NUR
PULMONOLOY CONSULT



 at bedside assessing patient. He wants CVP monitoring to be performed, 500 
cc bolus of NS and 5%-300 ml albumin to be administered.

## 2019-04-22 NOTE — NUR
CONSENTS



Obtained consents for surgery from wife Karen via phone. Patient wife is still attempting 
to make it to bedside today.

## 2019-04-22 NOTE — NUR
Dr. Canchola called back

RN informed of all labs, gtts, urine output.He ordered:

-1 amp of D50 IV x1

-2 amps of sodium bicarb IV x1

-1 gram Calcium Gluconate IV x1

-repeat labs later in the morning..



RN performed TORB and MD verified orders to be correct. No additional orders received. Will 
wait for pharmacy to verify orders.

## 2019-04-22 NOTE — NUR
BLOOD BANK



MD returned call, ordered 2 units of platelets to be given. Called blood bank to notify them 
of orders to get units ready. They stated they only have one unit in house, they are 
ordering the second unit which might take up to 4 hours to received.

## 2019-04-22 NOTE — NUR
ROUNDING NOTE



Partial linen change performed, tiera area cleansed. Skin assessment performed. New linen and 
pad placed under patient. Patient repositioned on side. Tolerated activity well. Vitals 
stable. Continue to monitor.

## 2019-04-22 NOTE — NUR
REPORT



Report received from Keke GILLIAM, care assumed. Patient is intubated on ventilator, 
tolerating at this time. Lungs clear. Sedation of Diprivan. 99.0 rectal temp. PERRLA, cough 
and gag noted. Pulses palpable bilaterally. Sinus rhythm on bedside monitor. Patient on 
Levophed gtt. SCD's on left lower extremity, heels elevated on pillows. Quinn catheter 
patent, hung below bladder. Abdomen is large, round, and distended. See skin assessment. 
Patient repositioned, bed locked in lowest position. Will continue to monitor.

## 2019-04-22 NOTE — NUR
RT Transport Note:

Patient  transported to ct with TAWANA Swann. Patient transported to and from 
procedure on ventilator with previous ordered settings. Patient on cardiac monitor with 
alarms set and audible, ambu-bag/mask connected to 02 tank. Patient returned to room with no 
adverse reaction noted. Transport completed without incident.

## 2019-04-22 NOTE — NUR
POISON CONTROL

POISON CONTROL UPDATED ON PT'S STATUS, RECOMMENDED TO GIVEN 50 ML BICARB DUE TO INCREASING 
QRS ON EKG. WILL PAGE HOSPITALIST.

## 2019-04-22 NOTE — NUR
Patient's wife call

Informed of what the OR RN said regarding time of surgery. She verbalized understanding. No 
concerns or complaints voiced from her.

## 2019-04-22 NOTE — NUR
ACCUCHEK

PATIENT WITH CYANOTIC FINGERS TIPS. FINGER STICK GLUCOSE LEVEL ARE 46 AND 20 SO BLOOD DRAWN 
FROM CENTRAL LINE AND CHECKED SUGAR LEVEL AND IT IS 65.

## 2019-04-22 NOTE — NUR
ARTHUR Puga has decided to postpone surgery until tomorrow morning, due to continued low 
platelet count. He would like to transfuse two more units of platelets.

## 2019-04-22 NOTE — NUR
SURGICAL CONSULT



 at bedside assessing patient. He is aware of gtts, sedation, and labs. He has seen 
images. MD request Gastrografin to assess perforation/leak. He would like platelet count to 
be corrected before he will take patient in for surgery. Orders placed.

## 2019-04-23 VITALS — SYSTOLIC BLOOD PRESSURE: 107 MMHG | DIASTOLIC BLOOD PRESSURE: 47 MMHG

## 2019-04-23 VITALS — DIASTOLIC BLOOD PRESSURE: 55 MMHG | SYSTOLIC BLOOD PRESSURE: 97 MMHG

## 2019-04-23 VITALS — SYSTOLIC BLOOD PRESSURE: 80 MMHG | DIASTOLIC BLOOD PRESSURE: 46 MMHG

## 2019-04-23 VITALS — DIASTOLIC BLOOD PRESSURE: 63 MMHG | SYSTOLIC BLOOD PRESSURE: 118 MMHG

## 2019-04-23 VITALS — DIASTOLIC BLOOD PRESSURE: 51 MMHG | SYSTOLIC BLOOD PRESSURE: 113 MMHG

## 2019-04-23 VITALS — DIASTOLIC BLOOD PRESSURE: 59 MMHG | SYSTOLIC BLOOD PRESSURE: 113 MMHG

## 2019-04-23 VITALS — SYSTOLIC BLOOD PRESSURE: 99 MMHG | DIASTOLIC BLOOD PRESSURE: 60 MMHG

## 2019-04-23 VITALS — DIASTOLIC BLOOD PRESSURE: 48 MMHG | SYSTOLIC BLOOD PRESSURE: 91 MMHG

## 2019-04-23 VITALS — DIASTOLIC BLOOD PRESSURE: 52 MMHG | SYSTOLIC BLOOD PRESSURE: 91 MMHG

## 2019-04-23 VITALS — DIASTOLIC BLOOD PRESSURE: 47 MMHG | SYSTOLIC BLOOD PRESSURE: 104 MMHG

## 2019-04-23 VITALS — SYSTOLIC BLOOD PRESSURE: 89 MMHG | DIASTOLIC BLOOD PRESSURE: 47 MMHG

## 2019-04-23 VITALS — DIASTOLIC BLOOD PRESSURE: 67 MMHG | SYSTOLIC BLOOD PRESSURE: 122 MMHG

## 2019-04-23 VITALS — SYSTOLIC BLOOD PRESSURE: 90 MMHG | DIASTOLIC BLOOD PRESSURE: 44 MMHG

## 2019-04-23 VITALS — SYSTOLIC BLOOD PRESSURE: 121 MMHG | DIASTOLIC BLOOD PRESSURE: 63 MMHG

## 2019-04-23 VITALS — DIASTOLIC BLOOD PRESSURE: 44 MMHG | SYSTOLIC BLOOD PRESSURE: 89 MMHG

## 2019-04-23 VITALS — DIASTOLIC BLOOD PRESSURE: 60 MMHG | SYSTOLIC BLOOD PRESSURE: 119 MMHG

## 2019-04-23 VITALS — SYSTOLIC BLOOD PRESSURE: 107 MMHG | DIASTOLIC BLOOD PRESSURE: 61 MMHG

## 2019-04-23 VITALS — DIASTOLIC BLOOD PRESSURE: 60 MMHG | SYSTOLIC BLOOD PRESSURE: 100 MMHG

## 2019-04-23 VITALS — SYSTOLIC BLOOD PRESSURE: 94 MMHG | DIASTOLIC BLOOD PRESSURE: 45 MMHG

## 2019-04-23 VITALS — DIASTOLIC BLOOD PRESSURE: 14 MMHG | SYSTOLIC BLOOD PRESSURE: 126 MMHG

## 2019-04-23 VITALS — DIASTOLIC BLOOD PRESSURE: 65 MMHG | SYSTOLIC BLOOD PRESSURE: 114 MMHG

## 2019-04-23 VITALS — DIASTOLIC BLOOD PRESSURE: 46 MMHG | SYSTOLIC BLOOD PRESSURE: 90 MMHG

## 2019-04-23 VITALS — DIASTOLIC BLOOD PRESSURE: 47 MMHG | SYSTOLIC BLOOD PRESSURE: 90 MMHG

## 2019-04-23 VITALS — SYSTOLIC BLOOD PRESSURE: 87 MMHG | DIASTOLIC BLOOD PRESSURE: 54 MMHG

## 2019-04-23 VITALS — DIASTOLIC BLOOD PRESSURE: 79 MMHG | SYSTOLIC BLOOD PRESSURE: 145 MMHG

## 2019-04-23 VITALS — SYSTOLIC BLOOD PRESSURE: 94 MMHG | DIASTOLIC BLOOD PRESSURE: 57 MMHG

## 2019-04-23 VITALS — SYSTOLIC BLOOD PRESSURE: 82 MMHG | DIASTOLIC BLOOD PRESSURE: 34 MMHG

## 2019-04-23 VITALS — SYSTOLIC BLOOD PRESSURE: 70 MMHG | DIASTOLIC BLOOD PRESSURE: 39 MMHG

## 2019-04-23 VITALS — SYSTOLIC BLOOD PRESSURE: 104 MMHG | DIASTOLIC BLOOD PRESSURE: 47 MMHG

## 2019-04-23 VITALS — DIASTOLIC BLOOD PRESSURE: 50 MMHG | SYSTOLIC BLOOD PRESSURE: 105 MMHG

## 2019-04-23 VITALS — DIASTOLIC BLOOD PRESSURE: 58 MMHG | SYSTOLIC BLOOD PRESSURE: 92 MMHG

## 2019-04-23 VITALS — SYSTOLIC BLOOD PRESSURE: 108 MMHG | DIASTOLIC BLOOD PRESSURE: 68 MMHG

## 2019-04-23 VITALS — DIASTOLIC BLOOD PRESSURE: 72 MMHG | SYSTOLIC BLOOD PRESSURE: 137 MMHG

## 2019-04-23 VITALS — DIASTOLIC BLOOD PRESSURE: 62 MMHG | SYSTOLIC BLOOD PRESSURE: 118 MMHG

## 2019-04-23 VITALS — SYSTOLIC BLOOD PRESSURE: 119 MMHG | DIASTOLIC BLOOD PRESSURE: 60 MMHG

## 2019-04-23 VITALS — DIASTOLIC BLOOD PRESSURE: 58 MMHG | SYSTOLIC BLOOD PRESSURE: 93 MMHG

## 2019-04-23 VITALS — SYSTOLIC BLOOD PRESSURE: 97 MMHG | DIASTOLIC BLOOD PRESSURE: 55 MMHG

## 2019-04-23 VITALS — DIASTOLIC BLOOD PRESSURE: 54 MMHG | SYSTOLIC BLOOD PRESSURE: 109 MMHG

## 2019-04-23 VITALS — SYSTOLIC BLOOD PRESSURE: 93 MMHG | DIASTOLIC BLOOD PRESSURE: 45 MMHG

## 2019-04-23 VITALS — SYSTOLIC BLOOD PRESSURE: 91 MMHG | DIASTOLIC BLOOD PRESSURE: 57 MMHG

## 2019-04-23 VITALS — SYSTOLIC BLOOD PRESSURE: 93 MMHG | DIASTOLIC BLOOD PRESSURE: 56 MMHG

## 2019-04-23 VITALS — SYSTOLIC BLOOD PRESSURE: 89 MMHG | DIASTOLIC BLOOD PRESSURE: 53 MMHG

## 2019-04-23 VITALS — DIASTOLIC BLOOD PRESSURE: 42 MMHG | SYSTOLIC BLOOD PRESSURE: 95 MMHG

## 2019-04-23 VITALS — SYSTOLIC BLOOD PRESSURE: 110 MMHG | DIASTOLIC BLOOD PRESSURE: 46 MMHG

## 2019-04-23 VITALS — SYSTOLIC BLOOD PRESSURE: 94 MMHG | DIASTOLIC BLOOD PRESSURE: 59 MMHG

## 2019-04-23 VITALS — SYSTOLIC BLOOD PRESSURE: 104 MMHG | DIASTOLIC BLOOD PRESSURE: 58 MMHG

## 2019-04-23 VITALS — DIASTOLIC BLOOD PRESSURE: 51 MMHG | SYSTOLIC BLOOD PRESSURE: 89 MMHG

## 2019-04-23 VITALS — SYSTOLIC BLOOD PRESSURE: 121 MMHG | DIASTOLIC BLOOD PRESSURE: 67 MMHG

## 2019-04-23 VITALS — DIASTOLIC BLOOD PRESSURE: 50 MMHG | SYSTOLIC BLOOD PRESSURE: 112 MMHG

## 2019-04-23 VITALS — SYSTOLIC BLOOD PRESSURE: 96 MMHG | DIASTOLIC BLOOD PRESSURE: 47 MMHG

## 2019-04-23 VITALS — SYSTOLIC BLOOD PRESSURE: 107 MMHG | DIASTOLIC BLOOD PRESSURE: 68 MMHG

## 2019-04-23 VITALS — DIASTOLIC BLOOD PRESSURE: 53 MMHG | SYSTOLIC BLOOD PRESSURE: 106 MMHG

## 2019-04-23 VITALS — DIASTOLIC BLOOD PRESSURE: 85 MMHG | SYSTOLIC BLOOD PRESSURE: 145 MMHG

## 2019-04-23 VITALS — DIASTOLIC BLOOD PRESSURE: 44 MMHG | SYSTOLIC BLOOD PRESSURE: 92 MMHG

## 2019-04-23 VITALS — SYSTOLIC BLOOD PRESSURE: 109 MMHG | DIASTOLIC BLOOD PRESSURE: 34 MMHG

## 2019-04-23 VITALS — SYSTOLIC BLOOD PRESSURE: 108 MMHG | DIASTOLIC BLOOD PRESSURE: 51 MMHG

## 2019-04-23 VITALS — SYSTOLIC BLOOD PRESSURE: 85 MMHG | DIASTOLIC BLOOD PRESSURE: 50 MMHG

## 2019-04-23 VITALS — SYSTOLIC BLOOD PRESSURE: 88 MMHG | DIASTOLIC BLOOD PRESSURE: 50 MMHG

## 2019-04-23 VITALS — SYSTOLIC BLOOD PRESSURE: 74 MMHG | DIASTOLIC BLOOD PRESSURE: 32 MMHG

## 2019-04-23 VITALS — SYSTOLIC BLOOD PRESSURE: 62 MMHG | DIASTOLIC BLOOD PRESSURE: 27 MMHG

## 2019-04-23 VITALS — DIASTOLIC BLOOD PRESSURE: 61 MMHG | SYSTOLIC BLOOD PRESSURE: 120 MMHG

## 2019-04-23 VITALS — SYSTOLIC BLOOD PRESSURE: 105 MMHG | DIASTOLIC BLOOD PRESSURE: 58 MMHG

## 2019-04-23 VITALS — SYSTOLIC BLOOD PRESSURE: 123 MMHG | DIASTOLIC BLOOD PRESSURE: 68 MMHG

## 2019-04-23 VITALS — DIASTOLIC BLOOD PRESSURE: 63 MMHG | SYSTOLIC BLOOD PRESSURE: 114 MMHG

## 2019-04-23 VITALS — SYSTOLIC BLOOD PRESSURE: 81 MMHG | DIASTOLIC BLOOD PRESSURE: 52 MMHG

## 2019-04-23 VITALS — DIASTOLIC BLOOD PRESSURE: 48 MMHG | SYSTOLIC BLOOD PRESSURE: 87 MMHG

## 2019-04-23 VITALS — SYSTOLIC BLOOD PRESSURE: 83 MMHG | DIASTOLIC BLOOD PRESSURE: 41 MMHG

## 2019-04-23 VITALS — SYSTOLIC BLOOD PRESSURE: 84 MMHG | DIASTOLIC BLOOD PRESSURE: 20 MMHG

## 2019-04-23 VITALS — DIASTOLIC BLOOD PRESSURE: 65 MMHG | SYSTOLIC BLOOD PRESSURE: 125 MMHG

## 2019-04-23 VITALS — SYSTOLIC BLOOD PRESSURE: 91 MMHG | DIASTOLIC BLOOD PRESSURE: 52 MMHG

## 2019-04-23 VITALS — DIASTOLIC BLOOD PRESSURE: 67 MMHG | SYSTOLIC BLOOD PRESSURE: 120 MMHG

## 2019-04-23 VITALS — SYSTOLIC BLOOD PRESSURE: 93 MMHG | DIASTOLIC BLOOD PRESSURE: 48 MMHG

## 2019-04-23 VITALS — DIASTOLIC BLOOD PRESSURE: 34 MMHG | SYSTOLIC BLOOD PRESSURE: 106 MMHG

## 2019-04-23 VITALS — SYSTOLIC BLOOD PRESSURE: 90 MMHG | DIASTOLIC BLOOD PRESSURE: 48 MMHG

## 2019-04-23 VITALS — SYSTOLIC BLOOD PRESSURE: 109 MMHG | DIASTOLIC BLOOD PRESSURE: 67 MMHG

## 2019-04-23 VITALS — SYSTOLIC BLOOD PRESSURE: 88 MMHG | DIASTOLIC BLOOD PRESSURE: 34 MMHG

## 2019-04-23 VITALS — DIASTOLIC BLOOD PRESSURE: 70 MMHG | SYSTOLIC BLOOD PRESSURE: 129 MMHG

## 2019-04-23 VITALS — DIASTOLIC BLOOD PRESSURE: 46 MMHG | SYSTOLIC BLOOD PRESSURE: 91 MMHG

## 2019-04-23 VITALS — SYSTOLIC BLOOD PRESSURE: 113 MMHG | DIASTOLIC BLOOD PRESSURE: 49 MMHG

## 2019-04-23 VITALS — SYSTOLIC BLOOD PRESSURE: 67 MMHG | DIASTOLIC BLOOD PRESSURE: 33 MMHG

## 2019-04-23 VITALS — DIASTOLIC BLOOD PRESSURE: 51 MMHG | SYSTOLIC BLOOD PRESSURE: 107 MMHG

## 2019-04-23 VITALS — SYSTOLIC BLOOD PRESSURE: 108 MMHG | DIASTOLIC BLOOD PRESSURE: 60 MMHG

## 2019-04-23 VITALS — DIASTOLIC BLOOD PRESSURE: 74 MMHG | SYSTOLIC BLOOD PRESSURE: 141 MMHG

## 2019-04-23 VITALS — DIASTOLIC BLOOD PRESSURE: 55 MMHG | SYSTOLIC BLOOD PRESSURE: 92 MMHG

## 2019-04-23 VITALS — DIASTOLIC BLOOD PRESSURE: 53 MMHG | SYSTOLIC BLOOD PRESSURE: 88 MMHG

## 2019-04-23 VITALS — DIASTOLIC BLOOD PRESSURE: 59 MMHG | SYSTOLIC BLOOD PRESSURE: 119 MMHG

## 2019-04-23 VITALS — SYSTOLIC BLOOD PRESSURE: 114 MMHG | DIASTOLIC BLOOD PRESSURE: 63 MMHG

## 2019-04-23 VITALS — DIASTOLIC BLOOD PRESSURE: 51 MMHG | SYSTOLIC BLOOD PRESSURE: 105 MMHG

## 2019-04-23 VITALS — SYSTOLIC BLOOD PRESSURE: 108 MMHG | DIASTOLIC BLOOD PRESSURE: 59 MMHG

## 2019-04-23 VITALS — SYSTOLIC BLOOD PRESSURE: 96 MMHG | DIASTOLIC BLOOD PRESSURE: 59 MMHG

## 2019-04-23 VITALS — DIASTOLIC BLOOD PRESSURE: 52 MMHG | SYSTOLIC BLOOD PRESSURE: 86 MMHG

## 2019-04-23 VITALS — SYSTOLIC BLOOD PRESSURE: 91 MMHG | DIASTOLIC BLOOD PRESSURE: 46 MMHG

## 2019-04-23 VITALS — SYSTOLIC BLOOD PRESSURE: 93 MMHG | DIASTOLIC BLOOD PRESSURE: 61 MMHG

## 2019-04-23 VITALS — DIASTOLIC BLOOD PRESSURE: 50 MMHG | SYSTOLIC BLOOD PRESSURE: 81 MMHG

## 2019-04-23 VITALS — DIASTOLIC BLOOD PRESSURE: 60 MMHG | SYSTOLIC BLOOD PRESSURE: 99 MMHG

## 2019-04-23 LAB
ALBUMIN SERPL-MCNC: 1.5 G/DL (ref 3.4–5)
ALBUMIN SERPL-MCNC: 1.6 G/DL (ref 3.4–5)
ALP SERPL-CCNC: 67 U/L (ref 45–117)
ALP SERPL-CCNC: 68 U/L (ref 45–117)
ALT SERPL-CCNC: 33 U/L (ref 16–61)
ALT SERPL-CCNC: 39 U/L (ref 16–61)
ANION GAP SERPL CALCULATED.3IONS-SCNC: 17 MMOL/L (ref 5–15)
ANION GAP SERPL CALCULATED.3IONS-SCNC: 17 MMOL/L (ref 5–15)
ANION GAP SERPL CALCULATED.3IONS-SCNC: 18 MMOL/L (ref 5–15)
BILIRUB SERPL-MCNC: 0.8 MG/DL (ref 0.2–1)
BILIRUB SERPL-MCNC: 0.8 MG/DL (ref 0.2–1)
BUN SERPL-MCNC: 57 MG/DL (ref 7–18)
BUN SERPL-MCNC: 58 MG/DL (ref 7–18)
BUN SERPL-MCNC: 66 MG/DL (ref 7–18)
BUN/CREAT SERPL: 13.2
BUN/CREAT SERPL: 13.3
BUN/CREAT SERPL: 14.4
CALCIUM SERPL-MCNC: 5.8 MG/DL (ref 8.5–10.1)
CALCIUM SERPL-MCNC: 6 MG/DL (ref 8.5–10.1)
CALCIUM SERPL-MCNC: 6.9 MG/DL (ref 8.5–10.1)
CHLORIDE SERPL-SCNC: 91 MMOL/L (ref 98–107)
CHLORIDE SERPL-SCNC: 93 MMOL/L (ref 98–107)
CHLORIDE SERPL-SCNC: 94 MMOL/L (ref 98–107)
CO2 SERPL-SCNC: 19 MMOL/L (ref 21–32)
GLUCOSE SERPL-MCNC: 122 MG/DL (ref 74–106)
GLUCOSE SERPL-MCNC: 123 MG/DL (ref 74–106)
GLUCOSE SERPL-MCNC: 66 MG/DL (ref 74–106)
HCT VFR BLD AUTO: 28.8 % (ref 41–53)
HCT VFR BLD AUTO: 28.8 % (ref 41–53)
HCT VFR BLD AUTO: 31.6 % (ref 41–53)
HGB BLD-MCNC: 11 G/DL (ref 13.5–17.5)
HGB BLD-MCNC: 9.7 G/DL (ref 13.5–17.5)
HGB BLD-MCNC: 9.7 G/DL (ref 13.5–17.5)
MCH RBC QN AUTO: 30.9 PG (ref 28–32)
MCH RBC QN AUTO: 31.2 PG (ref 28–32)
MCH RBC QN AUTO: 31.6 PG (ref 28–32)
MCV RBC AUTO: 90.8 FL (ref 80–100)
MCV RBC AUTO: 91.9 FL (ref 80–100)
MCV RBC AUTO: 92.5 FL (ref 80–100)
NRBC BLD QL AUTO: 0.1 %
POTASSIUM SERPL-SCNC: 4.6 MMOL/L (ref 3.5–5.1)
POTASSIUM SERPL-SCNC: 4.8 MMOL/L (ref 3.5–5.1)
POTASSIUM SERPL-SCNC: 4.8 MMOL/L (ref 3.5–5.1)
PROT SERPL-MCNC: 4.2 G/DL (ref 6.4–8.2)
PROT SERPL-MCNC: 4.9 G/DL (ref 6.4–8.2)
PROT UR-MCNC: 241.8 MG/DL (ref 0–11.9)
SODIUM SERPL-SCNC: 128 MMOL/L (ref 136–145)
SODIUM SERPL-SCNC: 129 MMOL/L (ref 136–145)
SODIUM SERPL-SCNC: 130 MMOL/L (ref 136–145)
WBC CLUMPS UR QL AUTO: PRESENT /HPF

## 2019-04-23 PROCEDURE — 0W9G00Z DRAINAGE OF PERITONEAL CAVITY WITH DRAINAGE DEVICE, OPEN APPROACH: ICD-10-PCS | Performed by: SURGERY

## 2019-04-23 PROCEDURE — 0DQ90ZZ REPAIR DUODENUM, OPEN APPROACH: ICD-10-PCS | Performed by: SURGERY

## 2019-04-23 PROCEDURE — 02HV33Z INSERTION OF INFUSION DEVICE INTO SUPERIOR VENA CAVA, PERCUTANEOUS APPROACH: ICD-10-PCS | Performed by: SURGERY

## 2019-04-23 RX ADMIN — NOREPINEPHRINE BITARTRATE SCH MLS/HR: 1 INJECTION, SOLUTION, CONCENTRATE INTRAVENOUS at 00:07

## 2019-04-23 RX ADMIN — SODIUM CHLORIDE SCH MLS/HR: 0.9 INJECTION, SOLUTION INTRAVENOUS at 07:23

## 2019-04-23 RX ADMIN — Medication SCH STRIP: at 11:30

## 2019-04-23 RX ADMIN — MIDAZOLAM SCH MLS/HR: 5 INJECTION, SOLUTION INTRAMUSCULAR; INTRAVENOUS at 12:19

## 2019-04-23 RX ADMIN — SODIUM CHLORIDE SCH MLS/HR: 0.9 INJECTION, SOLUTION INTRAVENOUS at 17:23

## 2019-04-23 RX ADMIN — SODIUM CHLORIDE SCH MLS/HR: 0.9 INJECTION, SOLUTION INTRAVENOUS at 00:41

## 2019-04-23 RX ADMIN — SODIUM CHLORIDE SCH MLS/HR: 0.9 INJECTION, SOLUTION INTRAVENOUS at 15:01

## 2019-04-23 RX ADMIN — PANTOPRAZOLE SODIUM SCH MG: 40 INJECTION, POWDER, FOR SOLUTION INTRAVENOUS at 10:00

## 2019-04-23 RX ADMIN — NOREPINEPHRINE BITARTRATE SCH MLS/HR: 1 INJECTION, SOLUTION, CONCENTRATE INTRAVENOUS at 04:21

## 2019-04-23 RX ADMIN — NOREPINEPHRINE BITARTRATE SCH MLS/HR: 1 INJECTION, SOLUTION, CONCENTRATE INTRAVENOUS at 08:55

## 2019-04-23 RX ADMIN — AMIODARONE HYDROCHLORIDE SCH MLS/HR: 50 INJECTION, SOLUTION INTRAVENOUS at 17:20

## 2019-04-23 RX ADMIN — Medication SCH STRIP: at 17:00

## 2019-04-23 RX ADMIN — LEUCINE, PHENYLALANINE, LYSINE, METHIONINE, ISOLEUCINE, VALINE, HISTIDINE, THREONINE, TRYPTOPHAN, ALANINE, GLYCINE, ARGININE, PROLINE, SERINE, TYROSINE, DEXTROSE NR MLS/HR: 311; 238; 247; 170; 255; 247; 204; 179; 77; 880; 438; 489; 289; 213; 17; 10 INJECTION INTRAVENOUS at 19:53

## 2019-04-23 RX ADMIN — NOREPINEPHRINE BITARTRATE SCH MLS/HR: 1 INJECTION, SOLUTION, CONCENTRATE INTRAVENOUS at 13:52

## 2019-04-23 RX ADMIN — DEXTROSE SCH MLS/HR: 5 SOLUTION INTRAVENOUS at 04:21

## 2019-04-23 RX ADMIN — DEXTROSE SCH MLS/HR: 5 SOLUTION INTRAVENOUS at 16:56

## 2019-04-23 RX ADMIN — HUMAN INSULIN SCH UNITS: 100 INJECTION, SOLUTION SUBCUTANEOUS at 06:03

## 2019-04-23 RX ADMIN — DEXTROSE SCH MLS/HR: 50 INJECTION, SOLUTION INTRAVENOUS at 10:30

## 2019-04-23 RX ADMIN — MIDAZOLAM SCH MLS/HR: 5 INJECTION, SOLUTION INTRAMUSCULAR; INTRAVENOUS at 08:30

## 2019-04-23 RX ADMIN — SODIUM CHLORIDE SCH MLS/HR: 0.9 INJECTION, SOLUTION INTRAVENOUS at 02:45

## 2019-04-23 RX ADMIN — SODIUM CHLORIDE SCH MLS/HR: 0.9 INJECTION, SOLUTION INTRAVENOUS at 13:31

## 2019-04-23 RX ADMIN — CEFTRIAXONE SODIUM SCH MLS/HR: 1 INJECTION, POWDER, FOR SOLUTION INTRAMUSCULAR; INTRAVENOUS at 11:12

## 2019-04-23 RX ADMIN — SODIUM CHLORIDE SCH MLS/HR: 0.9 INJECTION, SOLUTION INTRAVENOUS at 04:21

## 2019-04-23 RX ADMIN — AMIODARONE HYDROCHLORIDE SCH MLS/HR: 50 INJECTION, SOLUTION INTRAVENOUS at 20:31

## 2019-04-23 RX ADMIN — PROPOFOL SCH MLS/HR: 10 INJECTION, EMULSION INTRAVENOUS at 05:45

## 2019-04-23 RX ADMIN — Medication SCH STRIP: at 06:03

## 2019-04-23 RX ADMIN — SODIUM CHLORIDE SCH MLS/HR: 0.9 INJECTION, SOLUTION INTRAVENOUS at 12:45

## 2019-04-23 RX ADMIN — HUMAN INSULIN SCH UNITS: 100 INJECTION, SOLUTION SUBCUTANEOUS at 17:00

## 2019-04-23 RX ADMIN — HUMAN INSULIN SCH UNITS: 100 INJECTION, SOLUTION SUBCUTANEOUS at 11:30

## 2019-04-23 RX ADMIN — SODIUM CHLORIDE SCH MLS/HR: 0.9 INJECTION, SOLUTION INTRAVENOUS at 15:31

## 2019-04-23 RX ADMIN — SODIUM CHLORIDE SCH MLS/HR: 0.9 INJECTION, SOLUTION INTRAVENOUS at 11:30

## 2019-04-23 NOTE — NUR
MD AT BEDSIDE



 anesthesiologist at bedside assessing patients hemodynamic stability and gtts. MD 
requesting Versed be started and Diprivan stopped for surgery.

## 2019-04-23 NOTE — NUR
Respiratory note: PATIENT TRANSPORTED TO SURGERY AT THIS TIME WITH OR TEAM. PATENT WAS TAKEN 
OFF VENTILATOR AND BAGGED VIA AMBU-BAG WITH 100% FIO2. HANDOFF REPORT AND VENT SETTINGS 
GIVEN TO OR NURSE. TRANSPORT COMPLETED WITHOUT INCIDENT.

## 2019-04-23 NOTE — NUR
NEPHROLOGY ROUNDS



 at bedside assessing patient. He is aware of labs. No new orders received at this 
time.

## 2019-04-23 NOTE — NUR
LOW PLATELET

PLT 36, HOSPITALIST WAS PAGED AND RIP OROZCO CALLED BACK. NP ORDERED 2 BAGS (20 UNITS)OF 
PLATELETS.

## 2019-04-23 NOTE — NUR
Midline Placement

Patient's family educated by primary RN on need for midline placement.  All risks and 
benefits explained and all questions and concerns addresses prior to procedure.  18g/10cm 
midline inserted via right brachial vein & 18g/10cm midline inserted via leftt brachial vein 
using Ultrasound.  Sterile technique utilized.  Blood return obtained from each of the 
lumens and flushed easily with NS using proper technique.  Midlines secured with saline 
lock; biodiscs and occlusive dressings applied.  

Primary RN notified.

*Right brachial midline lot #[id number] x 1 attempt.

*Left brachial midline lot #[id number] x 2 attempts.

## 2019-04-23 NOTE — NUR
MD HOLLINGSWORTH



Spoke with  regarding blood pressure trends, surgery, and medication. Orders 
received.

## 2019-04-23 NOTE — NUR
MD ROUNDS



 at bedside assessing patient and speaking with patients wife Karen regarding 
status and plan of care. He is aware of platelet count decreasing. Orders for transfusing 
received, and electrolyte replacement. Will notify MD of any further changes from baseline.

## 2019-04-23 NOTE — NUR
PT RETURNED TO ROOM



Patient back in room post exploratory laparotomy, Abdomen is soft, ELY drain noted in left 
quadrant, sanguinous drainage noted. Patient connected to bedside monitor, Sinus rhythm 
90's. Blood pressure systolic 60's consistently despite 3 L bolus and albumin given in PACU. 
Orders for Vasopressin obtained and started . Oxygen saturation 74 on 40% fio2. RT paged to 
bedside. Fio2 increased to 100% then titrated down to 60% to maintain pulse ox of 96%. Lungs 
coarse anteriorly with thin clear secretions noted in ETT during suctioning. Patient supine, 
no distress noted. Continue to monitor and titrate medications to achieve hemodynamic 
stability.

## 2019-04-23 NOTE — NUR
SURGICAL ROUNDS

 at bedside assessing patient. He is aware patient is now on two vasopressors. He 
has seen morning labs and platelet count. He is planning to take patient to OR in 30 
minutes.

## 2019-04-23 NOTE — NUR
BLOOD PRODUCTS



Second unit of platelet administration begun, no reactions noted. Continue to monitor

## 2019-04-23 NOTE — NUR
BLOOD PRODUCTS



First unit of platelet administration begun, no reactions noted. Continue to monitor.

## 2019-04-23 NOTE — NUR
WOUND CARE NURSE ROUNDING



Patient is too unstable at this time to turn completely on side. Full skin assessment was 
performed this morning when patient was more stable. Patient now on three vasopressors at 
max rate with minimal blood pressure stability.

## 2019-04-23 NOTE — NUR
HOSPITALIST

CBC AND BMP REPORT INFORMED TO RIP OROZCO AND SHE ORDERED TO GIVE CALCIUM IVPBX1 ,NP OTHER 
ORDERS RECEIVED.

## 2019-04-23 NOTE — NUR
PERSONAL CARE



Complete linen change performed. Skin assessment performed. New linen, gown, and tiera pad 
placed. Patient repositioned on side. Oral care performed, small amount of pink tinge 
secretions noted orally, clear in ETT. Abdominal binder in place. Patient tolerated fair. 
Vitals stable at this time. Will continue to monitor.

## 2019-04-23 NOTE — NUR
NUTRITION ASSESSMENT NOTES



Please refer to link notes of nutrition screen form filed under the intervention section of 
the plan of care for further details.



Est. Needs: 1850 kcal to 2250 kcal (25-30 kcal/kgBW), 60 gms to 75 gms pro (0.8-1.0 
gms/kgBW). Will continue to monitor pertinent labs and reassess nutrient need prn



Thank you.


-------------------------------------------------------------------------------

Addendum: 04/23/19 at 1119 by Mya Youssef RD

-------------------------------------------------------------------------------

Amended: Links added.

## 2019-04-23 NOTE — NUR
NEURO STATUS

PATIENT IS SEDATED ON VERSED DRIP 6MG/HR. PATIENT IS UNRESPONSIVE, NOT RESPONDING TO ANY 
STIMULI. NO GAG AND COUGH NOTED. SEDATION TURNED OFF TO CHECK NEURO STATUS.

## 2019-04-23 NOTE — NUR
SEDATION



Diprivan gtt stopped per MD request. Versed begun, titration to achieve moderate sedation.

## 2019-04-23 NOTE — NUR
OPENING SHIFT NOTE



Report received from Juan RN, care assumed. Patient is intubated on ventilator, tolerating 
at this time. Lungs clear anteriorly, oxygen sat 98%. Sedation of Diprivan. 99.9 rectal 
temp. Ice packs applied to trunk for cooling measures. PERRLA, cough and gag noted. Pulses 
palpable bilaterally. Sinus rhythm on bedside monitor. Patient on Levophed and Neosynephrine 
gtt. SCD's on bilateral lower extremity, heels elevated on pillows. Quinn catheter patent, 
hung below bladder. Abdomen is large, round, and distended. Bowel sounds absent. See skin 
assessment. Patient repositioned, bed locked in lowest position. Will continue to monitor.

## 2019-04-23 NOTE — NUR
O.R.



Patient transferred to OR via bed with portable monitor, oxygen, RT, and OR team at bedside. 
Consents signed and placed in chart. Wife notified of patient being taken to surgery. She 
will be waiting in OR waiting room. Vitals stable at time of transport.

## 2019-04-23 NOTE — NUR
WOUND CARE NOTE:

Wound care in to see patient per wound care request regarding intubation status, and low 
Onur score of 13, putting patient to high risk for skin breakdown. Patient is 53 years old 
male with admitting diagnosis of Overdose. He has history of COPD,DM, GERD, High Lipids, 
Htn, Seizures.  Patient is resting in  ICU bed in  Rm. 107. He's intubated, sedated and 
mechanically ventilated. Patient appears to be in no pain using Ghotra Baker Faces Pain Scale. 




Unable to turn patient to assess sacral and back bony prominences per bedside nurse request 
due to "Unstable to turn, max on three vasopressors". RN Navya reported that patient's 
sacral and back was assessed this morning and no pressure injury related issue noted.  
Assesses anterior upper and lower extremities, no wound noted other than R forearm 
ecchymosis and mottled skin to bilateral foot. Patient's wife at bedside, reported that 
prior this hospitalization, patient ambulates with the use of walker but not with steady 
gait due to neuropathy, patient  complaints of BLE pain.



RECOMMENDATION:

BID/PRN cleaning and application of Barrier cream to sacral, buttocks and perineum as 
preventative per MD order, frequent turning and repositioning schedule as condition permits, 
redistribute pressure points with pillows, elevate heels on pillows, Continue monitoring by 
Wound care while patient is  mechanically ventilated.

## 2019-04-23 NOTE — NUR
MD PAGE:

DR. WHITLEY PAGED FOR CRITICAL RESULT OF LOW PLATELET COUNT OF 14 AND CALCIUM LEVEL OF 5.8. 
COVERING PRIMARY RN AT TIME FOR LUNCH SHIFT. WAITING FOR CALL BACK.

## 2019-04-24 VITALS — DIASTOLIC BLOOD PRESSURE: 56 MMHG | SYSTOLIC BLOOD PRESSURE: 113 MMHG

## 2019-04-24 VITALS — SYSTOLIC BLOOD PRESSURE: 113 MMHG | DIASTOLIC BLOOD PRESSURE: 61 MMHG

## 2019-04-24 VITALS — DIASTOLIC BLOOD PRESSURE: 54 MMHG | SYSTOLIC BLOOD PRESSURE: 106 MMHG

## 2019-04-24 VITALS — DIASTOLIC BLOOD PRESSURE: 59 MMHG | SYSTOLIC BLOOD PRESSURE: 114 MMHG

## 2019-04-24 VITALS — SYSTOLIC BLOOD PRESSURE: 115 MMHG | DIASTOLIC BLOOD PRESSURE: 61 MMHG

## 2019-04-24 VITALS — SYSTOLIC BLOOD PRESSURE: 116 MMHG | DIASTOLIC BLOOD PRESSURE: 59 MMHG

## 2019-04-24 VITALS — DIASTOLIC BLOOD PRESSURE: 56 MMHG | SYSTOLIC BLOOD PRESSURE: 112 MMHG

## 2019-04-24 VITALS — DIASTOLIC BLOOD PRESSURE: 63 MMHG | SYSTOLIC BLOOD PRESSURE: 108 MMHG

## 2019-04-24 VITALS — SYSTOLIC BLOOD PRESSURE: 98 MMHG | DIASTOLIC BLOOD PRESSURE: 45 MMHG

## 2019-04-24 VITALS — SYSTOLIC BLOOD PRESSURE: 143 MMHG | DIASTOLIC BLOOD PRESSURE: 67 MMHG

## 2019-04-24 VITALS — DIASTOLIC BLOOD PRESSURE: 72 MMHG | SYSTOLIC BLOOD PRESSURE: 148 MMHG

## 2019-04-24 VITALS — SYSTOLIC BLOOD PRESSURE: 112 MMHG | DIASTOLIC BLOOD PRESSURE: 56 MMHG

## 2019-04-24 VITALS — SYSTOLIC BLOOD PRESSURE: 137 MMHG | DIASTOLIC BLOOD PRESSURE: 63 MMHG

## 2019-04-24 VITALS — DIASTOLIC BLOOD PRESSURE: 61 MMHG | SYSTOLIC BLOOD PRESSURE: 106 MMHG

## 2019-04-24 VITALS — SYSTOLIC BLOOD PRESSURE: 95 MMHG | DIASTOLIC BLOOD PRESSURE: 73 MMHG

## 2019-04-24 VITALS — SYSTOLIC BLOOD PRESSURE: 140 MMHG | DIASTOLIC BLOOD PRESSURE: 65 MMHG

## 2019-04-24 VITALS — DIASTOLIC BLOOD PRESSURE: 66 MMHG | SYSTOLIC BLOOD PRESSURE: 111 MMHG

## 2019-04-24 VITALS — DIASTOLIC BLOOD PRESSURE: 55 MMHG | SYSTOLIC BLOOD PRESSURE: 110 MMHG

## 2019-04-24 VITALS — DIASTOLIC BLOOD PRESSURE: 54 MMHG | SYSTOLIC BLOOD PRESSURE: 116 MMHG

## 2019-04-24 VITALS — DIASTOLIC BLOOD PRESSURE: 67 MMHG | SYSTOLIC BLOOD PRESSURE: 136 MMHG

## 2019-04-24 VITALS — DIASTOLIC BLOOD PRESSURE: 68 MMHG | SYSTOLIC BLOOD PRESSURE: 120 MMHG

## 2019-04-24 VITALS — DIASTOLIC BLOOD PRESSURE: 43 MMHG | SYSTOLIC BLOOD PRESSURE: 96 MMHG

## 2019-04-24 VITALS — DIASTOLIC BLOOD PRESSURE: 58 MMHG | SYSTOLIC BLOOD PRESSURE: 115 MMHG

## 2019-04-24 VITALS — SYSTOLIC BLOOD PRESSURE: 121 MMHG | DIASTOLIC BLOOD PRESSURE: 60 MMHG

## 2019-04-24 VITALS — DIASTOLIC BLOOD PRESSURE: 53 MMHG | SYSTOLIC BLOOD PRESSURE: 107 MMHG

## 2019-04-24 VITALS — SYSTOLIC BLOOD PRESSURE: 111 MMHG | DIASTOLIC BLOOD PRESSURE: 53 MMHG

## 2019-04-24 VITALS — DIASTOLIC BLOOD PRESSURE: 46 MMHG | SYSTOLIC BLOOD PRESSURE: 102 MMHG

## 2019-04-24 VITALS — SYSTOLIC BLOOD PRESSURE: 111 MMHG | DIASTOLIC BLOOD PRESSURE: 58 MMHG

## 2019-04-24 VITALS — DIASTOLIC BLOOD PRESSURE: 53 MMHG | SYSTOLIC BLOOD PRESSURE: 117 MMHG

## 2019-04-24 VITALS — SYSTOLIC BLOOD PRESSURE: 133 MMHG | DIASTOLIC BLOOD PRESSURE: 73 MMHG

## 2019-04-24 VITALS — SYSTOLIC BLOOD PRESSURE: 107 MMHG | DIASTOLIC BLOOD PRESSURE: 59 MMHG

## 2019-04-24 VITALS — DIASTOLIC BLOOD PRESSURE: 55 MMHG | SYSTOLIC BLOOD PRESSURE: 115 MMHG

## 2019-04-24 VITALS — DIASTOLIC BLOOD PRESSURE: 54 MMHG | SYSTOLIC BLOOD PRESSURE: 104 MMHG

## 2019-04-24 VITALS — DIASTOLIC BLOOD PRESSURE: 62 MMHG | SYSTOLIC BLOOD PRESSURE: 111 MMHG

## 2019-04-24 VITALS — SYSTOLIC BLOOD PRESSURE: 117 MMHG | DIASTOLIC BLOOD PRESSURE: 53 MMHG

## 2019-04-24 VITALS — DIASTOLIC BLOOD PRESSURE: 55 MMHG | SYSTOLIC BLOOD PRESSURE: 116 MMHG

## 2019-04-24 VITALS — DIASTOLIC BLOOD PRESSURE: 67 MMHG | SYSTOLIC BLOOD PRESSURE: 110 MMHG

## 2019-04-24 VITALS — SYSTOLIC BLOOD PRESSURE: 115 MMHG | DIASTOLIC BLOOD PRESSURE: 56 MMHG

## 2019-04-24 VITALS — DIASTOLIC BLOOD PRESSURE: 63 MMHG | SYSTOLIC BLOOD PRESSURE: 127 MMHG

## 2019-04-24 VITALS — SYSTOLIC BLOOD PRESSURE: 111 MMHG | DIASTOLIC BLOOD PRESSURE: 55 MMHG

## 2019-04-24 VITALS — DIASTOLIC BLOOD PRESSURE: 52 MMHG | SYSTOLIC BLOOD PRESSURE: 108 MMHG

## 2019-04-24 VITALS — SYSTOLIC BLOOD PRESSURE: 105 MMHG | DIASTOLIC BLOOD PRESSURE: 47 MMHG

## 2019-04-24 VITALS — DIASTOLIC BLOOD PRESSURE: 59 MMHG | SYSTOLIC BLOOD PRESSURE: 107 MMHG

## 2019-04-24 VITALS — SYSTOLIC BLOOD PRESSURE: 113 MMHG | DIASTOLIC BLOOD PRESSURE: 58 MMHG

## 2019-04-24 VITALS — SYSTOLIC BLOOD PRESSURE: 115 MMHG | DIASTOLIC BLOOD PRESSURE: 59 MMHG

## 2019-04-24 VITALS — SYSTOLIC BLOOD PRESSURE: 110 MMHG | DIASTOLIC BLOOD PRESSURE: 55 MMHG

## 2019-04-24 VITALS — SYSTOLIC BLOOD PRESSURE: 108 MMHG | DIASTOLIC BLOOD PRESSURE: 62 MMHG

## 2019-04-24 VITALS — DIASTOLIC BLOOD PRESSURE: 72 MMHG

## 2019-04-24 VITALS — DIASTOLIC BLOOD PRESSURE: 41 MMHG | SYSTOLIC BLOOD PRESSURE: 91 MMHG

## 2019-04-24 VITALS — DIASTOLIC BLOOD PRESSURE: 63 MMHG | SYSTOLIC BLOOD PRESSURE: 134 MMHG

## 2019-04-24 VITALS — SYSTOLIC BLOOD PRESSURE: 117 MMHG | DIASTOLIC BLOOD PRESSURE: 63 MMHG

## 2019-04-24 VITALS — SYSTOLIC BLOOD PRESSURE: 138 MMHG | DIASTOLIC BLOOD PRESSURE: 71 MMHG

## 2019-04-24 VITALS — SYSTOLIC BLOOD PRESSURE: 91 MMHG | DIASTOLIC BLOOD PRESSURE: 51 MMHG

## 2019-04-24 VITALS — SYSTOLIC BLOOD PRESSURE: 118 MMHG | DIASTOLIC BLOOD PRESSURE: 53 MMHG

## 2019-04-24 VITALS — SYSTOLIC BLOOD PRESSURE: 110 MMHG | DIASTOLIC BLOOD PRESSURE: 58 MMHG

## 2019-04-24 VITALS — DIASTOLIC BLOOD PRESSURE: 66 MMHG | SYSTOLIC BLOOD PRESSURE: 113 MMHG

## 2019-04-24 VITALS — SYSTOLIC BLOOD PRESSURE: 106 MMHG | DIASTOLIC BLOOD PRESSURE: 62 MMHG

## 2019-04-24 VITALS — DIASTOLIC BLOOD PRESSURE: 60 MMHG | SYSTOLIC BLOOD PRESSURE: 115 MMHG

## 2019-04-24 VITALS — DIASTOLIC BLOOD PRESSURE: 56 MMHG | SYSTOLIC BLOOD PRESSURE: 115 MMHG

## 2019-04-24 VITALS — DIASTOLIC BLOOD PRESSURE: 64 MMHG | SYSTOLIC BLOOD PRESSURE: 127 MMHG

## 2019-04-24 VITALS — DIASTOLIC BLOOD PRESSURE: 62 MMHG

## 2019-04-24 VITALS — DIASTOLIC BLOOD PRESSURE: 48 MMHG | SYSTOLIC BLOOD PRESSURE: 107 MMHG

## 2019-04-24 VITALS — DIASTOLIC BLOOD PRESSURE: 47 MMHG | SYSTOLIC BLOOD PRESSURE: 86 MMHG

## 2019-04-24 VITALS — SYSTOLIC BLOOD PRESSURE: 118 MMHG | DIASTOLIC BLOOD PRESSURE: 65 MMHG

## 2019-04-24 VITALS — SYSTOLIC BLOOD PRESSURE: 107 MMHG | DIASTOLIC BLOOD PRESSURE: 56 MMHG

## 2019-04-24 VITALS — DIASTOLIC BLOOD PRESSURE: 58 MMHG | SYSTOLIC BLOOD PRESSURE: 112 MMHG

## 2019-04-24 VITALS — SYSTOLIC BLOOD PRESSURE: 127 MMHG | DIASTOLIC BLOOD PRESSURE: 63 MMHG

## 2019-04-24 VITALS — DIASTOLIC BLOOD PRESSURE: 50 MMHG | SYSTOLIC BLOOD PRESSURE: 106 MMHG

## 2019-04-24 VITALS — DIASTOLIC BLOOD PRESSURE: 54 MMHG | SYSTOLIC BLOOD PRESSURE: 108 MMHG

## 2019-04-24 VITALS — SYSTOLIC BLOOD PRESSURE: 99 MMHG | DIASTOLIC BLOOD PRESSURE: 48 MMHG

## 2019-04-24 VITALS — DIASTOLIC BLOOD PRESSURE: 68 MMHG

## 2019-04-24 VITALS — SYSTOLIC BLOOD PRESSURE: 86 MMHG | DIASTOLIC BLOOD PRESSURE: 47 MMHG

## 2019-04-24 VITALS — SYSTOLIC BLOOD PRESSURE: 109 MMHG | DIASTOLIC BLOOD PRESSURE: 47 MMHG

## 2019-04-24 VITALS — DIASTOLIC BLOOD PRESSURE: 66 MMHG | SYSTOLIC BLOOD PRESSURE: 121 MMHG

## 2019-04-24 VITALS — DIASTOLIC BLOOD PRESSURE: 64 MMHG

## 2019-04-24 VITALS — DIASTOLIC BLOOD PRESSURE: 41 MMHG | SYSTOLIC BLOOD PRESSURE: 101 MMHG

## 2019-04-24 VITALS — DIASTOLIC BLOOD PRESSURE: 58 MMHG | SYSTOLIC BLOOD PRESSURE: 109 MMHG

## 2019-04-24 VITALS — SYSTOLIC BLOOD PRESSURE: 106 MMHG | DIASTOLIC BLOOD PRESSURE: 56 MMHG

## 2019-04-24 VITALS — SYSTOLIC BLOOD PRESSURE: 121 MMHG | DIASTOLIC BLOOD PRESSURE: 57 MMHG

## 2019-04-24 VITALS — SYSTOLIC BLOOD PRESSURE: 97 MMHG | DIASTOLIC BLOOD PRESSURE: 53 MMHG

## 2019-04-24 VITALS — SYSTOLIC BLOOD PRESSURE: 121 MMHG | DIASTOLIC BLOOD PRESSURE: 69 MMHG

## 2019-04-24 VITALS — DIASTOLIC BLOOD PRESSURE: 62 MMHG | SYSTOLIC BLOOD PRESSURE: 115 MMHG

## 2019-04-24 VITALS — DIASTOLIC BLOOD PRESSURE: 53 MMHG | SYSTOLIC BLOOD PRESSURE: 109 MMHG

## 2019-04-24 VITALS — DIASTOLIC BLOOD PRESSURE: 74 MMHG | SYSTOLIC BLOOD PRESSURE: 126 MMHG

## 2019-04-24 VITALS — SYSTOLIC BLOOD PRESSURE: 113 MMHG | DIASTOLIC BLOOD PRESSURE: 55 MMHG

## 2019-04-24 VITALS — SYSTOLIC BLOOD PRESSURE: 97 MMHG | DIASTOLIC BLOOD PRESSURE: 56 MMHG

## 2019-04-24 VITALS — DIASTOLIC BLOOD PRESSURE: 57 MMHG | SYSTOLIC BLOOD PRESSURE: 115 MMHG

## 2019-04-24 VITALS — DIASTOLIC BLOOD PRESSURE: 53 MMHG | SYSTOLIC BLOOD PRESSURE: 108 MMHG

## 2019-04-24 VITALS — DIASTOLIC BLOOD PRESSURE: 71 MMHG | SYSTOLIC BLOOD PRESSURE: 126 MMHG

## 2019-04-24 VITALS — DIASTOLIC BLOOD PRESSURE: 66 MMHG | SYSTOLIC BLOOD PRESSURE: 143 MMHG

## 2019-04-24 VITALS — DIASTOLIC BLOOD PRESSURE: 66 MMHG | SYSTOLIC BLOOD PRESSURE: 108 MMHG

## 2019-04-24 VITALS — SYSTOLIC BLOOD PRESSURE: 112 MMHG | DIASTOLIC BLOOD PRESSURE: 62 MMHG

## 2019-04-24 VITALS — DIASTOLIC BLOOD PRESSURE: 51 MMHG | SYSTOLIC BLOOD PRESSURE: 104 MMHG

## 2019-04-24 VITALS — SYSTOLIC BLOOD PRESSURE: 109 MMHG | DIASTOLIC BLOOD PRESSURE: 59 MMHG

## 2019-04-24 VITALS — DIASTOLIC BLOOD PRESSURE: 65 MMHG

## 2019-04-24 VITALS — DIASTOLIC BLOOD PRESSURE: 57 MMHG | SYSTOLIC BLOOD PRESSURE: 91 MMHG

## 2019-04-24 VITALS — DIASTOLIC BLOOD PRESSURE: 63 MMHG | SYSTOLIC BLOOD PRESSURE: 114 MMHG

## 2019-04-24 VITALS — DIASTOLIC BLOOD PRESSURE: 50 MMHG | SYSTOLIC BLOOD PRESSURE: 101 MMHG

## 2019-04-24 VITALS — SYSTOLIC BLOOD PRESSURE: 111 MMHG | DIASTOLIC BLOOD PRESSURE: 54 MMHG

## 2019-04-24 LAB
ALBUMIN SERPL-MCNC: 1.3 G/DL (ref 3.4–5)
ALP SERPL-CCNC: 83 U/L (ref 45–117)
ALT SERPL-CCNC: 28 U/L (ref 16–61)
AMYLASE SERPL-CCNC: 26 U/L (ref 25–115)
ANION GAP SERPL CALCULATED.3IONS-SCNC: 16 MMOL/L (ref 5–15)
BILIRUB SERPL-MCNC: 0.7 MG/DL (ref 0.2–1)
BUN SERPL-MCNC: 57 MG/DL (ref 7–18)
BUN/CREAT SERPL: 15
CALCIUM SERPL-MCNC: 6.3 MG/DL (ref 8.5–10.1)
CHLORIDE SERPL-SCNC: 93 MMOL/L (ref 98–107)
CO2 SERPL-SCNC: 20 MMOL/L (ref 21–32)
GLUCOSE SERPL-MCNC: 117 MG/DL (ref 74–106)
HCT VFR BLD AUTO: 28.4 % (ref 41–53)
HGB BLD-MCNC: 9.8 G/DL (ref 13.5–17.5)
LIPASE SERPL-CCNC: < 10 U/L (ref 73–393)
MAGNESIUM SERPL-MCNC: 1.3 MG/DL (ref 1.6–2.6)
MCH RBC QN AUTO: 31.5 PG (ref 28–32)
MCV RBC AUTO: 91.5 FL (ref 80–100)
NRBC BLD QL AUTO: 0.2 %
POTASSIUM SERPL-SCNC: 3.9 MMOL/L (ref 3.5–5.1)
PROT SERPL-MCNC: 4 G/DL (ref 6.4–8.2)
SODIUM SERPL-SCNC: 129 MMOL/L (ref 136–145)
TRIGL SERPL-MCNC: 207 MG/DL (ref ?–150)

## 2019-04-24 PROCEDURE — 03HY32Z INSERTION OF MONITORING DEVICE INTO UPPER ARTERY, PERCUTANEOUS APPROACH: ICD-10-PCS | Performed by: SURGERY

## 2019-04-24 RX ADMIN — HUMAN INSULIN SCH UNITS: 100 INJECTION, SOLUTION SUBCUTANEOUS at 00:10

## 2019-04-24 RX ADMIN — SODIUM CHLORIDE SCH MLS/HR: 0.9 INJECTION, SOLUTION INTRAVENOUS at 11:12

## 2019-04-24 RX ADMIN — SODIUM CHLORIDE SCH MLS/HR: 0.9 INJECTION, SOLUTION INTRAVENOUS at 00:36

## 2019-04-24 RX ADMIN — Medication SCH STRIP: at 23:51

## 2019-04-24 RX ADMIN — HUMAN INSULIN SCH UNITS: 100 INJECTION, SOLUTION SUBCUTANEOUS at 18:38

## 2019-04-24 RX ADMIN — LEUCINE, PHENYLALANINE, LYSINE, METHIONINE, ISOLEUCINE, VALINE, HISTIDINE, THREONINE, TRYPTOPHAN, ALANINE, GLYCINE, ARGININE, PROLINE, SERINE, TYROSINE, DEXTROSE NR MLS/HR: 311; 238; 247; 170; 255; 247; 204; 179; 77; 880; 438; 489; 289; 213; 17; 10 INJECTION INTRAVENOUS at 19:49

## 2019-04-24 RX ADMIN — DEXTROSE SCH MLS/HR: 5 SOLUTION INTRAVENOUS at 03:33

## 2019-04-24 RX ADMIN — HUMAN INSULIN SCH UNITS: 100 INJECTION, SOLUTION SUBCUTANEOUS at 23:51

## 2019-04-24 RX ADMIN — ALBUMIN (HUMAN) SCH MLS/HR: 0.25 INJECTION, SOLUTION INTRAVENOUS at 23:50

## 2019-04-24 RX ADMIN — DEXTROSE SCH MLS/HR: 50 INJECTION, SOLUTION INTRAVENOUS at 09:21

## 2019-04-24 RX ADMIN — MIDAZOLAM SCH MLS/HR: 5 INJECTION, SOLUTION INTRAMUSCULAR; INTRAVENOUS at 21:00

## 2019-04-24 RX ADMIN — DEXTROSE SCH MLS/HR: 5 SOLUTION INTRAVENOUS at 16:45

## 2019-04-24 RX ADMIN — Medication SCH STRIP: at 00:10

## 2019-04-24 RX ADMIN — SODIUM CHLORIDE SCH MLS/HR: 0.9 INJECTION, SOLUTION INTRAVENOUS at 00:37

## 2019-04-24 RX ADMIN — DEXTROSE SCH MLS/HR: 5 SOLUTION INTRAVENOUS at 23:45

## 2019-04-24 RX ADMIN — IPRATROPIUM BROMIDE PRN MG: 0.5 SOLUTION RESPIRATORY (INHALATION) at 22:09

## 2019-04-24 RX ADMIN — ALBUMIN (HUMAN) SCH MLS/HR: 0.25 INJECTION, SOLUTION INTRAVENOUS at 19:53

## 2019-04-24 RX ADMIN — SODIUM CHLORIDE SCH MLS/HR: 0.9 INJECTION, SOLUTION INTRAVENOUS at 16:00

## 2019-04-24 RX ADMIN — DEXTROSE SCH MLS/HR: 5 SOLUTION INTRAVENOUS at 09:45

## 2019-04-24 RX ADMIN — ALBUMIN (HUMAN) SCH MLS/HR: 0.25 INJECTION, SOLUTION INTRAVENOUS at 16:00

## 2019-04-24 RX ADMIN — Medication SCH STRIP: at 12:56

## 2019-04-24 RX ADMIN — MEROPENEM SCH MLS/HR: 1 INJECTION, POWDER, FOR SOLUTION INTRAVENOUS at 16:00

## 2019-04-24 RX ADMIN — Medication SCH STRIP: at 18:16

## 2019-04-24 RX ADMIN — ALBUTEROL SULFATE PRN MG: 2.5 SOLUTION RESPIRATORY (INHALATION) at 22:09

## 2019-04-24 RX ADMIN — HUMAN INSULIN SCH UNITS: 100 INJECTION, SOLUTION SUBCUTANEOUS at 13:00

## 2019-04-24 RX ADMIN — Medication SCH STRIP: at 06:10

## 2019-04-24 RX ADMIN — PANTOPRAZOLE SODIUM SCH MG: 40 INJECTION, POWDER, FOR SOLUTION INTRAVENOUS at 11:11

## 2019-04-24 RX ADMIN — SODIUM CHLORIDE SCH MLS/HR: 0.9 INJECTION, SOLUTION INTRAVENOUS at 23:09

## 2019-04-24 RX ADMIN — PROPOFOL SCH MLS/HR: 10 INJECTION, EMULSION INTRAVENOUS at 21:00

## 2019-04-24 RX ADMIN — HUMAN INSULIN SCH UNITS: 100 INJECTION, SOLUTION SUBCUTANEOUS at 06:10

## 2019-04-24 NOTE — NUR
HOSPITALIST CALLED BACK

RIP OROZCO CALLED BACK. NOTIFIED RT.ARM SWELLING AND BLISTERS. ORDER RECEIVED FOR USG OF 
RT.ARM.

## 2019-04-24 NOTE — NUR
opening note

assumed care of pt at this time. Report received from day shift RN. POC reviewed. Head to 
toe assessment complete, see intervention spreadsheet for complete details. received pt on 
ventilator. Sedation had been d/c since 2000 2/23. Pt non responsive at this time. Received 
pt with OGT on LCS. abd binder in place. J tube draining serosanguineous fluid. IV sites 
benign. Pt extremities cool to touch. hands and feet are purplish with weak pulses. Pt has 
tight edematous skin. ABD binder in place s/p ex lap. Received pt on levo at 30 mcg and shahana 
at 114 mcg. f/c draining to gravity. Suction and BVM at bedside. Bed locked and in lowest 
position, safety precautions in place. Will monitor pt carefully.

## 2019-04-24 NOTE — NUR
PATIENT'S WIFE PHONES - UPDATED ON PATIENT CURRENT CONDITION ET CURRENT POC - VERBALIZES 
UNDERSTANDING.

## 2019-04-24 NOTE — NUR
Placed call to Dr. Robison re: low platelet count, low Ca level, neuro status, atb change et 
Doppler result.

## 2019-04-24 NOTE — NUR
RT. ARM SWELLING

RT.ARM SWELLING IS INCREASING, DEVELOPED BLISTERS ON RT.AC AND FOREARM. WILL TAKE PICTURES 
AND INFORM MD.

## 2019-04-24 NOTE — NUR
Nutrition consult/ Follow-up Notes (new PN)



Wt.: 74.6 kg



Pt continues to be intubated off propofol today with no family by bedside. per records pt 
with drug overdose. pt is currently NPO initiated on PN support from last night @ 42 ml/hr 
providing 1050 kcals and 50 gm protein 850 NCP. pt with inadequate PN support as it meets 
46-56% kcals and 66-80% proteins



Est. Needs: 1850 kcal to 2250 kcal (25-30 kcal/kgBW), 60 gms to 75 gms pro (0.8-1.0 gms/kgBW 
r/t elev RFT). Will continue to monitor pertinent labs and reassess nutrient need prn



Labs: PREALB <3.0 L, CA 6.3 L, ALB 1.3 L,  H,  H, BUN 57 H, CREAT 3.8 H



Skin: Onur scale 14, mod risk, skin intact per RN documentation.

 

GI: Pt has no BM reported gastric drainage 200 ml per RN documentation. 



PES:

Increased nutrient needs r/t current/chronic medical condition aeb intubated, sedated, s/p 
surgery, severe hypoalbuminemia, NPO.

Altered nutrition related lab values r/t current/chronic medical condition aeb 
hyperglycemia, hyponatremia, hypocapnia, hypochloremia, elev. 

renal labs, HbA1c, AST, hypocalcemia and severe hypoalbuminemia



Will continue to monitor NPO status, PN tolerance, skin status, pertinent labs and weight 
trend. F/u in 2-3 days.

Rec.: 1) advance PN support to meet > 75% of needs. 2) Advance gradually to oral diet when 
medically appropriate. 3.) Refer to CDE/RD for further nutrition education and weight 
monitoring upon discharged. 4.) Continue current plan of care.

## 2019-04-24 NOTE — NUR
SPOKE WITH WIFE SANFORD. PW TO BE CHANGED TO CYNDI. IF FAMILY CALLS PLEASE DIRECT THEM 
TO SPEAK WITH WIFE REGARDING ANY INFORMATION FOR PATIENT. UPDATE PROVIDED.

## 2019-04-24 NOTE — NUR
NEURO STATUS

PATIENT HAS GAG. NO OTHER RESPONSE. SEDATION IS STILL OFF. PATIENT'S RESPIRATORY RATES ARE 
IN LOW 20'S.

## 2019-04-24 NOTE — NUR
DR WHITLEY VISITS ET EXAMINES PATIENT - NEW ORDERS RECEIVED AFTER UPDATE ON LABS, ATB ET 
NEURO STATUS.

## 2019-04-25 VITALS — SYSTOLIC BLOOD PRESSURE: 89 MMHG | DIASTOLIC BLOOD PRESSURE: 45 MMHG

## 2019-04-25 VITALS — SYSTOLIC BLOOD PRESSURE: 106 MMHG | DIASTOLIC BLOOD PRESSURE: 61 MMHG

## 2019-04-25 VITALS — SYSTOLIC BLOOD PRESSURE: 131 MMHG | DIASTOLIC BLOOD PRESSURE: 56 MMHG

## 2019-04-25 VITALS — DIASTOLIC BLOOD PRESSURE: 58 MMHG | SYSTOLIC BLOOD PRESSURE: 152 MMHG

## 2019-04-25 VITALS — SYSTOLIC BLOOD PRESSURE: 101 MMHG | DIASTOLIC BLOOD PRESSURE: 48 MMHG

## 2019-04-25 VITALS — SYSTOLIC BLOOD PRESSURE: 104 MMHG | DIASTOLIC BLOOD PRESSURE: 62 MMHG

## 2019-04-25 VITALS — SYSTOLIC BLOOD PRESSURE: 153 MMHG | DIASTOLIC BLOOD PRESSURE: 61 MMHG

## 2019-04-25 VITALS — SYSTOLIC BLOOD PRESSURE: 95 MMHG | DIASTOLIC BLOOD PRESSURE: 46 MMHG

## 2019-04-25 VITALS — DIASTOLIC BLOOD PRESSURE: 62 MMHG | SYSTOLIC BLOOD PRESSURE: 137 MMHG

## 2019-04-25 VITALS — DIASTOLIC BLOOD PRESSURE: 51 MMHG | SYSTOLIC BLOOD PRESSURE: 107 MMHG

## 2019-04-25 VITALS — SYSTOLIC BLOOD PRESSURE: 118 MMHG

## 2019-04-25 VITALS — DIASTOLIC BLOOD PRESSURE: 61 MMHG

## 2019-04-25 VITALS — DIASTOLIC BLOOD PRESSURE: 85 MMHG | SYSTOLIC BLOOD PRESSURE: 161 MMHG

## 2019-04-25 VITALS — SYSTOLIC BLOOD PRESSURE: 113 MMHG | DIASTOLIC BLOOD PRESSURE: 53 MMHG

## 2019-04-25 VITALS — SYSTOLIC BLOOD PRESSURE: 119 MMHG | DIASTOLIC BLOOD PRESSURE: 56 MMHG

## 2019-04-25 VITALS — DIASTOLIC BLOOD PRESSURE: 43 MMHG | SYSTOLIC BLOOD PRESSURE: 95 MMHG

## 2019-04-25 VITALS — DIASTOLIC BLOOD PRESSURE: 85 MMHG | SYSTOLIC BLOOD PRESSURE: 160 MMHG

## 2019-04-25 VITALS — SYSTOLIC BLOOD PRESSURE: 125 MMHG | DIASTOLIC BLOOD PRESSURE: 52 MMHG

## 2019-04-25 VITALS — DIASTOLIC BLOOD PRESSURE: 61 MMHG | SYSTOLIC BLOOD PRESSURE: 113 MMHG

## 2019-04-25 VITALS — DIASTOLIC BLOOD PRESSURE: 71 MMHG | SYSTOLIC BLOOD PRESSURE: 150 MMHG

## 2019-04-25 VITALS — DIASTOLIC BLOOD PRESSURE: 58 MMHG

## 2019-04-25 VITALS — DIASTOLIC BLOOD PRESSURE: 83 MMHG | SYSTOLIC BLOOD PRESSURE: 195 MMHG

## 2019-04-25 VITALS — DIASTOLIC BLOOD PRESSURE: 62 MMHG | SYSTOLIC BLOOD PRESSURE: 195 MMHG

## 2019-04-25 VITALS — SYSTOLIC BLOOD PRESSURE: 135 MMHG | DIASTOLIC BLOOD PRESSURE: 68 MMHG

## 2019-04-25 VITALS — SYSTOLIC BLOOD PRESSURE: 135 MMHG | DIASTOLIC BLOOD PRESSURE: 47 MMHG

## 2019-04-25 VITALS — SYSTOLIC BLOOD PRESSURE: 107 MMHG

## 2019-04-25 VITALS — DIASTOLIC BLOOD PRESSURE: 55 MMHG

## 2019-04-25 VITALS — SYSTOLIC BLOOD PRESSURE: 105 MMHG

## 2019-04-25 VITALS — SYSTOLIC BLOOD PRESSURE: 127 MMHG

## 2019-04-25 VITALS — DIASTOLIC BLOOD PRESSURE: 59 MMHG | SYSTOLIC BLOOD PRESSURE: 134 MMHG

## 2019-04-25 VITALS — SYSTOLIC BLOOD PRESSURE: 133 MMHG | DIASTOLIC BLOOD PRESSURE: 66 MMHG

## 2019-04-25 VITALS — SYSTOLIC BLOOD PRESSURE: 152 MMHG | DIASTOLIC BLOOD PRESSURE: 61 MMHG

## 2019-04-25 VITALS — DIASTOLIC BLOOD PRESSURE: 49 MMHG | SYSTOLIC BLOOD PRESSURE: 135 MMHG

## 2019-04-25 VITALS — SYSTOLIC BLOOD PRESSURE: 117 MMHG | DIASTOLIC BLOOD PRESSURE: 59 MMHG

## 2019-04-25 VITALS — DIASTOLIC BLOOD PRESSURE: 55 MMHG | SYSTOLIC BLOOD PRESSURE: 116 MMHG

## 2019-04-25 VITALS — DIASTOLIC BLOOD PRESSURE: 47 MMHG | SYSTOLIC BLOOD PRESSURE: 94 MMHG

## 2019-04-25 VITALS — SYSTOLIC BLOOD PRESSURE: 112 MMHG

## 2019-04-25 VITALS — SYSTOLIC BLOOD PRESSURE: 135 MMHG | DIASTOLIC BLOOD PRESSURE: 66 MMHG

## 2019-04-25 VITALS — SYSTOLIC BLOOD PRESSURE: 152 MMHG | DIASTOLIC BLOOD PRESSURE: 60 MMHG

## 2019-04-25 VITALS — DIASTOLIC BLOOD PRESSURE: 54 MMHG | SYSTOLIC BLOOD PRESSURE: 115 MMHG

## 2019-04-25 VITALS — DIASTOLIC BLOOD PRESSURE: 60 MMHG | SYSTOLIC BLOOD PRESSURE: 119 MMHG

## 2019-04-25 VITALS — SYSTOLIC BLOOD PRESSURE: 102 MMHG

## 2019-04-25 VITALS — SYSTOLIC BLOOD PRESSURE: 95 MMHG | DIASTOLIC BLOOD PRESSURE: 51 MMHG

## 2019-04-25 VITALS — DIASTOLIC BLOOD PRESSURE: 55 MMHG | SYSTOLIC BLOOD PRESSURE: 121 MMHG

## 2019-04-25 VITALS — DIASTOLIC BLOOD PRESSURE: 67 MMHG | SYSTOLIC BLOOD PRESSURE: 146 MMHG

## 2019-04-25 VITALS — DIASTOLIC BLOOD PRESSURE: 81 MMHG | SYSTOLIC BLOOD PRESSURE: 157 MMHG

## 2019-04-25 VITALS — SYSTOLIC BLOOD PRESSURE: 131 MMHG

## 2019-04-25 VITALS — SYSTOLIC BLOOD PRESSURE: 106 MMHG | DIASTOLIC BLOOD PRESSURE: 40 MMHG

## 2019-04-25 VITALS — DIASTOLIC BLOOD PRESSURE: 57 MMHG | SYSTOLIC BLOOD PRESSURE: 125 MMHG

## 2019-04-25 VITALS — SYSTOLIC BLOOD PRESSURE: 144 MMHG | DIASTOLIC BLOOD PRESSURE: 56 MMHG

## 2019-04-25 VITALS — SYSTOLIC BLOOD PRESSURE: 119 MMHG | DIASTOLIC BLOOD PRESSURE: 52 MMHG

## 2019-04-25 VITALS — DIASTOLIC BLOOD PRESSURE: 61 MMHG | SYSTOLIC BLOOD PRESSURE: 99 MMHG

## 2019-04-25 VITALS — DIASTOLIC BLOOD PRESSURE: 54 MMHG | SYSTOLIC BLOOD PRESSURE: 120 MMHG

## 2019-04-25 VITALS — DIASTOLIC BLOOD PRESSURE: 70 MMHG | SYSTOLIC BLOOD PRESSURE: 144 MMHG

## 2019-04-25 VITALS — DIASTOLIC BLOOD PRESSURE: 53 MMHG | SYSTOLIC BLOOD PRESSURE: 104 MMHG

## 2019-04-25 VITALS — SYSTOLIC BLOOD PRESSURE: 177 MMHG | DIASTOLIC BLOOD PRESSURE: 73 MMHG

## 2019-04-25 VITALS — DIASTOLIC BLOOD PRESSURE: 66 MMHG | SYSTOLIC BLOOD PRESSURE: 177 MMHG

## 2019-04-25 VITALS — SYSTOLIC BLOOD PRESSURE: 92 MMHG | DIASTOLIC BLOOD PRESSURE: 42 MMHG

## 2019-04-25 VITALS — DIASTOLIC BLOOD PRESSURE: 62 MMHG | SYSTOLIC BLOOD PRESSURE: 121 MMHG

## 2019-04-25 VITALS — SYSTOLIC BLOOD PRESSURE: 140 MMHG | DIASTOLIC BLOOD PRESSURE: 60 MMHG

## 2019-04-25 VITALS — DIASTOLIC BLOOD PRESSURE: 55 MMHG | SYSTOLIC BLOOD PRESSURE: 137 MMHG

## 2019-04-25 VITALS — SYSTOLIC BLOOD PRESSURE: 124 MMHG | DIASTOLIC BLOOD PRESSURE: 66 MMHG

## 2019-04-25 VITALS — DIASTOLIC BLOOD PRESSURE: 62 MMHG | SYSTOLIC BLOOD PRESSURE: 89 MMHG

## 2019-04-25 VITALS — SYSTOLIC BLOOD PRESSURE: 114 MMHG

## 2019-04-25 VITALS — DIASTOLIC BLOOD PRESSURE: 61 MMHG | SYSTOLIC BLOOD PRESSURE: 148 MMHG

## 2019-04-25 VITALS — DIASTOLIC BLOOD PRESSURE: 57 MMHG | SYSTOLIC BLOOD PRESSURE: 128 MMHG

## 2019-04-25 VITALS — SYSTOLIC BLOOD PRESSURE: 127 MMHG | DIASTOLIC BLOOD PRESSURE: 47 MMHG

## 2019-04-25 VITALS — SYSTOLIC BLOOD PRESSURE: 107 MMHG | DIASTOLIC BLOOD PRESSURE: 51 MMHG

## 2019-04-25 VITALS — SYSTOLIC BLOOD PRESSURE: 101 MMHG

## 2019-04-25 VITALS — DIASTOLIC BLOOD PRESSURE: 50 MMHG

## 2019-04-25 VITALS — SYSTOLIC BLOOD PRESSURE: 132 MMHG

## 2019-04-25 VITALS — DIASTOLIC BLOOD PRESSURE: 51 MMHG

## 2019-04-25 VITALS — DIASTOLIC BLOOD PRESSURE: 40 MMHG | SYSTOLIC BLOOD PRESSURE: 122 MMHG

## 2019-04-25 VITALS — SYSTOLIC BLOOD PRESSURE: 93 MMHG | DIASTOLIC BLOOD PRESSURE: 45 MMHG

## 2019-04-25 VITALS — SYSTOLIC BLOOD PRESSURE: 98 MMHG | DIASTOLIC BLOOD PRESSURE: 47 MMHG

## 2019-04-25 VITALS — SYSTOLIC BLOOD PRESSURE: 99 MMHG | DIASTOLIC BLOOD PRESSURE: 48 MMHG

## 2019-04-25 VITALS — DIASTOLIC BLOOD PRESSURE: 59 MMHG | SYSTOLIC BLOOD PRESSURE: 127 MMHG

## 2019-04-25 VITALS — SYSTOLIC BLOOD PRESSURE: 88 MMHG | DIASTOLIC BLOOD PRESSURE: 53 MMHG

## 2019-04-25 VITALS — SYSTOLIC BLOOD PRESSURE: 97 MMHG

## 2019-04-25 VITALS — DIASTOLIC BLOOD PRESSURE: 64 MMHG

## 2019-04-25 VITALS — SYSTOLIC BLOOD PRESSURE: 140 MMHG

## 2019-04-25 VITALS — SYSTOLIC BLOOD PRESSURE: 102 MMHG | DIASTOLIC BLOOD PRESSURE: 56 MMHG

## 2019-04-25 VITALS — DIASTOLIC BLOOD PRESSURE: 47 MMHG

## 2019-04-25 VITALS — SYSTOLIC BLOOD PRESSURE: 129 MMHG

## 2019-04-25 VITALS — SYSTOLIC BLOOD PRESSURE: 113 MMHG

## 2019-04-25 VITALS — SYSTOLIC BLOOD PRESSURE: 188 MMHG | DIASTOLIC BLOOD PRESSURE: 81 MMHG

## 2019-04-25 VITALS — SYSTOLIC BLOOD PRESSURE: 135 MMHG | DIASTOLIC BLOOD PRESSURE: 44 MMHG

## 2019-04-25 VITALS — DIASTOLIC BLOOD PRESSURE: 57 MMHG

## 2019-04-25 VITALS — DIASTOLIC BLOOD PRESSURE: 66 MMHG

## 2019-04-25 VITALS — SYSTOLIC BLOOD PRESSURE: 123 MMHG

## 2019-04-25 VITALS — SYSTOLIC BLOOD PRESSURE: 134 MMHG

## 2019-04-25 LAB
ALBUMIN SERPL-MCNC: 1.9 G/DL (ref 3.4–5)
ALP SERPL-CCNC: 75 U/L (ref 45–117)
ALT SERPL-CCNC: 21 U/L (ref 16–61)
ANION GAP SERPL CALCULATED.3IONS-SCNC: 14 MMOL/L (ref 5–15)
BILIRUB SERPL-MCNC: 1.3 MG/DL (ref 0.2–1)
BUN SERPL-MCNC: 52 MG/DL (ref 7–18)
BUN/CREAT SERPL: 17.3
CALCIUM SERPL-MCNC: 6.4 MG/DL (ref 8.5–10.1)
CHLORIDE SERPL-SCNC: 95 MMOL/L (ref 98–107)
CO2 SERPL-SCNC: 22 MMOL/L (ref 21–32)
FERRITIN SERPL-MCNC: 208.8 NG/ML (ref 10–322)
GLUCOSE SERPL-MCNC: 159 MG/DL (ref 74–106)
HCT VFR BLD AUTO: 24.8 % (ref 41–53)
HGB BLD-MCNC: 8.6 G/DL (ref 13.5–17.5)
INR PPP: 1.12 (ref 0.9–1.15)
IRON SERPL-MCNC: 11 UG/DL (ref 65–175)
MAGNESIUM SERPL-MCNC: 1.5 MG/DL (ref 1.6–2.6)
MCH RBC QN AUTO: 31.4 PG (ref 28–32)
MCV RBC AUTO: 90.2 FL (ref 80–100)
NRBC BLD QL AUTO: 1 %
POTASSIUM SERPL-SCNC: 2.9 MMOL/L (ref 3.5–5.1)
PROT SERPL-MCNC: 4.4 G/DL (ref 6.4–8.2)
PROTHROMBIN TIME: 11.9 SEC (ref 9.27–12.13)
SODIUM SERPL-SCNC: 131 MMOL/L (ref 136–145)
TIBC SERPL-MCNC: 90 UG/DL (ref 250–450)

## 2019-04-25 RX ADMIN — MEROPENEM SCH MLS/HR: 1 INJECTION, POWDER, FOR SOLUTION INTRAVENOUS at 16:47

## 2019-04-25 RX ADMIN — DEXTROSE SCH MLS/HR: 50 INJECTION, SOLUTION INTRAVENOUS at 10:30

## 2019-04-25 RX ADMIN — POTASSIUM CHLORIDE SCH MLS/HR: 200 INJECTION, SOLUTION INTRAVENOUS at 22:59

## 2019-04-25 RX ADMIN — MIDAZOLAM SCH MLS/HR: 5 INJECTION, SOLUTION INTRAMUSCULAR; INTRAVENOUS at 16:54

## 2019-04-25 RX ADMIN — Medication SCH STRIP: at 12:00

## 2019-04-25 RX ADMIN — HUMAN INSULIN SCH UNITS: 100 INJECTION, SOLUTION SUBCUTANEOUS at 12:11

## 2019-04-25 RX ADMIN — MAGNESIUM SULFATE IN DEXTROSE SCH MLS/HR: 10 INJECTION, SOLUTION INTRAVENOUS at 10:00

## 2019-04-25 RX ADMIN — HUMAN INSULIN SCH UNITS: 100 INJECTION, SOLUTION SUBCUTANEOUS at 18:26

## 2019-04-25 RX ADMIN — POTASSIUM CHLORIDE SCH MLS/HR: 200 INJECTION, SOLUTION INTRAVENOUS at 12:45

## 2019-04-25 RX ADMIN — MIDAZOLAM SCH MLS/HR: 5 INJECTION, SOLUTION INTRAMUSCULAR; INTRAVENOUS at 01:36

## 2019-04-25 RX ADMIN — SODIUM CHLORIDE SCH MLS/HR: 0.9 INJECTION, SOLUTION INTRAVENOUS at 10:50

## 2019-04-25 RX ADMIN — MAGNESIUM SULFATE IN DEXTROSE SCH MLS/HR: 10 INJECTION, SOLUTION INTRAVENOUS at 09:13

## 2019-04-25 RX ADMIN — POTASSIUM CHLORIDE SCH MLS/HR: 200 INJECTION, SOLUTION INTRAVENOUS at 13:10

## 2019-04-25 RX ADMIN — SODIUM CHLORIDE SCH MLS/HR: 9 INJECTION, SOLUTION INTRAVENOUS at 20:06

## 2019-04-25 RX ADMIN — DEXTROSE SCH MLS/HR: 5 SOLUTION INTRAVENOUS at 14:15

## 2019-04-25 RX ADMIN — Medication SCH STRIP: at 18:20

## 2019-04-25 RX ADMIN — POTASSIUM CHLORIDE SCH MLS/HR: 200 INJECTION, SOLUTION INTRAVENOUS at 20:06

## 2019-04-25 RX ADMIN — IPRATROPIUM BROMIDE PRN MG: 0.5 SOLUTION RESPIRATORY (INHALATION) at 02:24

## 2019-04-25 RX ADMIN — ALBUTEROL SULFATE PRN MG: 2.5 SOLUTION RESPIRATORY (INHALATION) at 02:24

## 2019-04-25 RX ADMIN — DEXTROSE SCH MLS/HR: 5 SOLUTION INTRAVENOUS at 11:00

## 2019-04-25 RX ADMIN — MEROPENEM SCH MLS/HR: 1 INJECTION, POWDER, FOR SOLUTION INTRAVENOUS at 04:11

## 2019-04-25 RX ADMIN — MIDAZOLAM SCH MLS/HR: 5 INJECTION, SOLUTION INTRAMUSCULAR; INTRAVENOUS at 08:31

## 2019-04-25 RX ADMIN — SODIUM CHLORIDE SCH MLS/HR: 0.9 INJECTION, SOLUTION INTRAVENOUS at 10:53

## 2019-04-25 RX ADMIN — HUMAN INSULIN SCH UNITS: 100 INJECTION, SOLUTION SUBCUTANEOUS at 06:19

## 2019-04-25 RX ADMIN — PROPOFOL SCH MLS/HR: 10 INJECTION, EMULSION INTRAVENOUS at 21:32

## 2019-04-25 RX ADMIN — PANTOPRAZOLE SODIUM SCH MG: 40 INJECTION, POWDER, FOR SOLUTION INTRAVENOUS at 11:09

## 2019-04-25 RX ADMIN — DEXTROSE SCH MLS/HR: 5 SOLUTION INTRAVENOUS at 13:45

## 2019-04-25 RX ADMIN — Medication SCH STRIP: at 06:18

## 2019-04-25 RX ADMIN — AMIODARONE HYDROCHLORIDE SCH MLS/HR: 50 INJECTION, SOLUTION INTRAVENOUS at 15:00

## 2019-04-25 RX ADMIN — ALBUMIN (HUMAN) SCH MLS/HR: 0.25 INJECTION, SOLUTION INTRAVENOUS at 03:36

## 2019-04-25 RX ADMIN — POTASSIUM CHLORIDE SCH MLS/HR: 200 INJECTION, SOLUTION INTRAVENOUS at 12:11

## 2019-04-25 NOTE — NUR
NOTIFIED ERNESTO DILLON OF POTASSIUM LEVEL:



NOTIFIED OF POTASSIUM LEVEL, NGT OUTPUT, URINE OUTPUT. ORDERS FOR 40 MEQ KCL, ORDERS 
READBACK AND VERIFIED.

## 2019-04-25 NOTE — NUR
Resumed care at 0700.  Orders reviewed and ongoing assessments being done.  Being treated 
for multiple problems and remains intubated and sedated with Versed.  Per night shift report 
sedation had been turned off and had to be restarted due to increase in respiratory rate and 
not tolerating ventilator.  Dr. Bazan, neurologist was consulted.  Rounded at 0755 am today. 
 Assessment done and discussed plan of care.  CT scan of the head was done today.  Escorted 
by Mary GILLIAM from 0930 to 1005 am tolerated transport to department and returned without 
incident.  Also EEG was done at bedside per Dr. Bazan.   Dr. Bazan obtained number from hard 
chart and called the patient's mother to discussed condition and plan of care.



Dr. Canchola nephrologist and Dr. PRETTY Orosco, pulmonologist rounded at 1015.  Continue 
current plan of care. 



Dr. oHffman, Hospitalist rounded at 1430.  Examined and reviewed chart.  Wife Karen and 
son Cody at bedside and Dr. Hoffman spoke with both of them.  He discussed all findings and 
reviewed plan of care, all questions answered.



At this time remains intubated and sedated.  Remains on Levophed gtt & Phenylephrine gtt for 
hemodynamic stability.   Titrating as appropriate.  Infusing via R IJ TLC.  Amiodarone gtt 
also continues to infuse at a set rate, no episodes of AFIB, remains in a NSR.



Crystal to right groin remains intact and patient.  No bleeding noted or hematoma.  Removed 
midline to right arm at 0900.  Documented DVT to right arm.  Catheter intact and pressure 
held for 5 full minutes.  No hematoma noted.  



Both hands and feet mottled.  All nail beds dark purple blue.  Worsening since shift 
progresses. Dr. Hoffman aware.  

Scrotum swollen and weeping, changing absorbent pad Q2hr around scrotum.

## 2019-04-25 NOTE — NUR
ka this am 2.9. Hospitalist paged. Platelets 19. Per Md Diane continue with platelet 
transfusion to maintain plt count >20,000. Order placed.

## 2019-04-25 NOTE — NUR
TRANSPORTED PT TO CT WITH NO INCIDENCE REPORTED. CARDIAC MONITORING AT BEDSIDE WITH 2 02 
TANKS. AMBUBAG WITH MASK CONNECTED TO 02 TANK. VENTILATED PT USING TRANSPORT VENTILATOR. 
SPO2 92%.

## 2019-04-25 NOTE — NUR
Wound care photo taken of blister on right forearm that popped leaving pink skin tear. Form 
fast placed in folder.

## 2019-04-25 NOTE — NUR
Bed bath provided for pt at this time. PT bathed with CHG wipes. Clean linens provided for 
patient. Suction tubing and canisters changed out. Pt tolerated ok, O2 sats decrease to 89% 
when turning. PT went back to 94% immediately.

## 2019-04-25 NOTE — NUR
versed started at this time. Pt waking up. Pt breathing 25-30 and ventilator pressures 
increasing. Will assess for need.

## 2019-04-26 VITALS — SYSTOLIC BLOOD PRESSURE: 148 MMHG

## 2019-04-26 VITALS — SYSTOLIC BLOOD PRESSURE: 109 MMHG | DIASTOLIC BLOOD PRESSURE: 44 MMHG

## 2019-04-26 VITALS — DIASTOLIC BLOOD PRESSURE: 46 MMHG | SYSTOLIC BLOOD PRESSURE: 133 MMHG

## 2019-04-26 VITALS — DIASTOLIC BLOOD PRESSURE: 45 MMHG | SYSTOLIC BLOOD PRESSURE: 95 MMHG

## 2019-04-26 VITALS — DIASTOLIC BLOOD PRESSURE: 43 MMHG | SYSTOLIC BLOOD PRESSURE: 105 MMHG

## 2019-04-26 VITALS — DIASTOLIC BLOOD PRESSURE: 56 MMHG | SYSTOLIC BLOOD PRESSURE: 132 MMHG

## 2019-04-26 VITALS — SYSTOLIC BLOOD PRESSURE: 143 MMHG | DIASTOLIC BLOOD PRESSURE: 66 MMHG

## 2019-04-26 VITALS — DIASTOLIC BLOOD PRESSURE: 44 MMHG | SYSTOLIC BLOOD PRESSURE: 143 MMHG

## 2019-04-26 VITALS — DIASTOLIC BLOOD PRESSURE: 51 MMHG | SYSTOLIC BLOOD PRESSURE: 161 MMHG

## 2019-04-26 VITALS — DIASTOLIC BLOOD PRESSURE: 46 MMHG | SYSTOLIC BLOOD PRESSURE: 141 MMHG

## 2019-04-26 VITALS — DIASTOLIC BLOOD PRESSURE: 60 MMHG | SYSTOLIC BLOOD PRESSURE: 134 MMHG

## 2019-04-26 VITALS — SYSTOLIC BLOOD PRESSURE: 108 MMHG | DIASTOLIC BLOOD PRESSURE: 41 MMHG

## 2019-04-26 VITALS — DIASTOLIC BLOOD PRESSURE: 56 MMHG | SYSTOLIC BLOOD PRESSURE: 126 MMHG

## 2019-04-26 VITALS — DIASTOLIC BLOOD PRESSURE: 58 MMHG | SYSTOLIC BLOOD PRESSURE: 140 MMHG

## 2019-04-26 VITALS — DIASTOLIC BLOOD PRESSURE: 46 MMHG | SYSTOLIC BLOOD PRESSURE: 104 MMHG

## 2019-04-26 VITALS — SYSTOLIC BLOOD PRESSURE: 93 MMHG | DIASTOLIC BLOOD PRESSURE: 44 MMHG

## 2019-04-26 VITALS — DIASTOLIC BLOOD PRESSURE: 40 MMHG | SYSTOLIC BLOOD PRESSURE: 107 MMHG

## 2019-04-26 VITALS — SYSTOLIC BLOOD PRESSURE: 112 MMHG | DIASTOLIC BLOOD PRESSURE: 60 MMHG

## 2019-04-26 VITALS — SYSTOLIC BLOOD PRESSURE: 100 MMHG | DIASTOLIC BLOOD PRESSURE: 47 MMHG

## 2019-04-26 VITALS — DIASTOLIC BLOOD PRESSURE: 58 MMHG | SYSTOLIC BLOOD PRESSURE: 97 MMHG

## 2019-04-26 VITALS — SYSTOLIC BLOOD PRESSURE: 117 MMHG | DIASTOLIC BLOOD PRESSURE: 45 MMHG

## 2019-04-26 VITALS — DIASTOLIC BLOOD PRESSURE: 48 MMHG | SYSTOLIC BLOOD PRESSURE: 124 MMHG

## 2019-04-26 VITALS — DIASTOLIC BLOOD PRESSURE: 61 MMHG | SYSTOLIC BLOOD PRESSURE: 133 MMHG

## 2019-04-26 VITALS — DIASTOLIC BLOOD PRESSURE: 38 MMHG | SYSTOLIC BLOOD PRESSURE: 137 MMHG

## 2019-04-26 VITALS — SYSTOLIC BLOOD PRESSURE: 135 MMHG | DIASTOLIC BLOOD PRESSURE: 61 MMHG

## 2019-04-26 VITALS — SYSTOLIC BLOOD PRESSURE: 125 MMHG | DIASTOLIC BLOOD PRESSURE: 47 MMHG

## 2019-04-26 VITALS — DIASTOLIC BLOOD PRESSURE: 43 MMHG | SYSTOLIC BLOOD PRESSURE: 115 MMHG

## 2019-04-26 VITALS — DIASTOLIC BLOOD PRESSURE: 51 MMHG | SYSTOLIC BLOOD PRESSURE: 125 MMHG

## 2019-04-26 VITALS — SYSTOLIC BLOOD PRESSURE: 113 MMHG | DIASTOLIC BLOOD PRESSURE: 58 MMHG

## 2019-04-26 VITALS — DIASTOLIC BLOOD PRESSURE: 56 MMHG | SYSTOLIC BLOOD PRESSURE: 100 MMHG

## 2019-04-26 VITALS — DIASTOLIC BLOOD PRESSURE: 47 MMHG | SYSTOLIC BLOOD PRESSURE: 111 MMHG

## 2019-04-26 VITALS — SYSTOLIC BLOOD PRESSURE: 96 MMHG | DIASTOLIC BLOOD PRESSURE: 46 MMHG

## 2019-04-26 VITALS — DIASTOLIC BLOOD PRESSURE: 51 MMHG | SYSTOLIC BLOOD PRESSURE: 95 MMHG

## 2019-04-26 VITALS — SYSTOLIC BLOOD PRESSURE: 124 MMHG | DIASTOLIC BLOOD PRESSURE: 62 MMHG

## 2019-04-26 VITALS — DIASTOLIC BLOOD PRESSURE: 62 MMHG | SYSTOLIC BLOOD PRESSURE: 105 MMHG

## 2019-04-26 VITALS — SYSTOLIC BLOOD PRESSURE: 105 MMHG | DIASTOLIC BLOOD PRESSURE: 62 MMHG

## 2019-04-26 VITALS — DIASTOLIC BLOOD PRESSURE: 61 MMHG | SYSTOLIC BLOOD PRESSURE: 112 MMHG

## 2019-04-26 VITALS — DIASTOLIC BLOOD PRESSURE: 44 MMHG | SYSTOLIC BLOOD PRESSURE: 99 MMHG

## 2019-04-26 VITALS — SYSTOLIC BLOOD PRESSURE: 92 MMHG | DIASTOLIC BLOOD PRESSURE: 45 MMHG

## 2019-04-26 VITALS — DIASTOLIC BLOOD PRESSURE: 44 MMHG | SYSTOLIC BLOOD PRESSURE: 117 MMHG

## 2019-04-26 VITALS — DIASTOLIC BLOOD PRESSURE: 42 MMHG | SYSTOLIC BLOOD PRESSURE: 112 MMHG

## 2019-04-26 VITALS — SYSTOLIC BLOOD PRESSURE: 105 MMHG | DIASTOLIC BLOOD PRESSURE: 46 MMHG

## 2019-04-26 VITALS — SYSTOLIC BLOOD PRESSURE: 111 MMHG | DIASTOLIC BLOOD PRESSURE: 47 MMHG

## 2019-04-26 VITALS — DIASTOLIC BLOOD PRESSURE: 45 MMHG | SYSTOLIC BLOOD PRESSURE: 126 MMHG

## 2019-04-26 VITALS — DIASTOLIC BLOOD PRESSURE: 45 MMHG | SYSTOLIC BLOOD PRESSURE: 140 MMHG

## 2019-04-26 VITALS — SYSTOLIC BLOOD PRESSURE: 132 MMHG | DIASTOLIC BLOOD PRESSURE: 47 MMHG

## 2019-04-26 VITALS — DIASTOLIC BLOOD PRESSURE: 52 MMHG | SYSTOLIC BLOOD PRESSURE: 117 MMHG

## 2019-04-26 VITALS — SYSTOLIC BLOOD PRESSURE: 99 MMHG | DIASTOLIC BLOOD PRESSURE: 54 MMHG

## 2019-04-26 VITALS — SYSTOLIC BLOOD PRESSURE: 102 MMHG | DIASTOLIC BLOOD PRESSURE: 54 MMHG

## 2019-04-26 VITALS — SYSTOLIC BLOOD PRESSURE: 144 MMHG | DIASTOLIC BLOOD PRESSURE: 54 MMHG

## 2019-04-26 VITALS — DIASTOLIC BLOOD PRESSURE: 41 MMHG | SYSTOLIC BLOOD PRESSURE: 88 MMHG

## 2019-04-26 VITALS — SYSTOLIC BLOOD PRESSURE: 110 MMHG | DIASTOLIC BLOOD PRESSURE: 61 MMHG

## 2019-04-26 VITALS — SYSTOLIC BLOOD PRESSURE: 94 MMHG | DIASTOLIC BLOOD PRESSURE: 45 MMHG

## 2019-04-26 VITALS — SYSTOLIC BLOOD PRESSURE: 132 MMHG | DIASTOLIC BLOOD PRESSURE: 58 MMHG

## 2019-04-26 VITALS — DIASTOLIC BLOOD PRESSURE: 61 MMHG | SYSTOLIC BLOOD PRESSURE: 110 MMHG

## 2019-04-26 VITALS — DIASTOLIC BLOOD PRESSURE: 40 MMHG | SYSTOLIC BLOOD PRESSURE: 108 MMHG

## 2019-04-26 VITALS — DIASTOLIC BLOOD PRESSURE: 55 MMHG | SYSTOLIC BLOOD PRESSURE: 103 MMHG

## 2019-04-26 VITALS — SYSTOLIC BLOOD PRESSURE: 133 MMHG | DIASTOLIC BLOOD PRESSURE: 61 MMHG

## 2019-04-26 VITALS — SYSTOLIC BLOOD PRESSURE: 124 MMHG | DIASTOLIC BLOOD PRESSURE: 54 MMHG

## 2019-04-26 VITALS — SYSTOLIC BLOOD PRESSURE: 131 MMHG | DIASTOLIC BLOOD PRESSURE: 46 MMHG

## 2019-04-26 VITALS — SYSTOLIC BLOOD PRESSURE: 135 MMHG | DIASTOLIC BLOOD PRESSURE: 51 MMHG

## 2019-04-26 VITALS — DIASTOLIC BLOOD PRESSURE: 45 MMHG | SYSTOLIC BLOOD PRESSURE: 141 MMHG

## 2019-04-26 VITALS — DIASTOLIC BLOOD PRESSURE: 43 MMHG | SYSTOLIC BLOOD PRESSURE: 103 MMHG

## 2019-04-26 VITALS — DIASTOLIC BLOOD PRESSURE: 58 MMHG | SYSTOLIC BLOOD PRESSURE: 135 MMHG

## 2019-04-26 VITALS — SYSTOLIC BLOOD PRESSURE: 135 MMHG | DIASTOLIC BLOOD PRESSURE: 58 MMHG

## 2019-04-26 VITALS — DIASTOLIC BLOOD PRESSURE: 62 MMHG | SYSTOLIC BLOOD PRESSURE: 108 MMHG

## 2019-04-26 VITALS — SYSTOLIC BLOOD PRESSURE: 137 MMHG | DIASTOLIC BLOOD PRESSURE: 55 MMHG

## 2019-04-26 VITALS — DIASTOLIC BLOOD PRESSURE: 58 MMHG | SYSTOLIC BLOOD PRESSURE: 132 MMHG

## 2019-04-26 VITALS — SYSTOLIC BLOOD PRESSURE: 138 MMHG | DIASTOLIC BLOOD PRESSURE: 38 MMHG

## 2019-04-26 VITALS — DIASTOLIC BLOOD PRESSURE: 54 MMHG | SYSTOLIC BLOOD PRESSURE: 144 MMHG

## 2019-04-26 VITALS — SYSTOLIC BLOOD PRESSURE: 102 MMHG | DIASTOLIC BLOOD PRESSURE: 66 MMHG

## 2019-04-26 VITALS — DIASTOLIC BLOOD PRESSURE: 51 MMHG | SYSTOLIC BLOOD PRESSURE: 142 MMHG

## 2019-04-26 VITALS — DIASTOLIC BLOOD PRESSURE: 37 MMHG | SYSTOLIC BLOOD PRESSURE: 137 MMHG

## 2019-04-26 VITALS — DIASTOLIC BLOOD PRESSURE: 60 MMHG | SYSTOLIC BLOOD PRESSURE: 106 MMHG

## 2019-04-26 VITALS — DIASTOLIC BLOOD PRESSURE: 45 MMHG | SYSTOLIC BLOOD PRESSURE: 132 MMHG

## 2019-04-26 VITALS — DIASTOLIC BLOOD PRESSURE: 38 MMHG | SYSTOLIC BLOOD PRESSURE: 99 MMHG

## 2019-04-26 VITALS — SYSTOLIC BLOOD PRESSURE: 101 MMHG | DIASTOLIC BLOOD PRESSURE: 38 MMHG

## 2019-04-26 VITALS — DIASTOLIC BLOOD PRESSURE: 57 MMHG | SYSTOLIC BLOOD PRESSURE: 129 MMHG

## 2019-04-26 VITALS — SYSTOLIC BLOOD PRESSURE: 144 MMHG | DIASTOLIC BLOOD PRESSURE: 46 MMHG

## 2019-04-26 VITALS — SYSTOLIC BLOOD PRESSURE: 102 MMHG | DIASTOLIC BLOOD PRESSURE: 55 MMHG

## 2019-04-26 VITALS — SYSTOLIC BLOOD PRESSURE: 101 MMHG | DIASTOLIC BLOOD PRESSURE: 45 MMHG

## 2019-04-26 VITALS — SYSTOLIC BLOOD PRESSURE: 107 MMHG | DIASTOLIC BLOOD PRESSURE: 62 MMHG

## 2019-04-26 VITALS — SYSTOLIC BLOOD PRESSURE: 96 MMHG | DIASTOLIC BLOOD PRESSURE: 44 MMHG

## 2019-04-26 VITALS — DIASTOLIC BLOOD PRESSURE: 58 MMHG | SYSTOLIC BLOOD PRESSURE: 96 MMHG

## 2019-04-26 VITALS — SYSTOLIC BLOOD PRESSURE: 89 MMHG | DIASTOLIC BLOOD PRESSURE: 59 MMHG

## 2019-04-26 VITALS — SYSTOLIC BLOOD PRESSURE: 123 MMHG | DIASTOLIC BLOOD PRESSURE: 57 MMHG

## 2019-04-26 VITALS — SYSTOLIC BLOOD PRESSURE: 105 MMHG | DIASTOLIC BLOOD PRESSURE: 60 MMHG

## 2019-04-26 VITALS — DIASTOLIC BLOOD PRESSURE: 46 MMHG | SYSTOLIC BLOOD PRESSURE: 112 MMHG

## 2019-04-26 VITALS — SYSTOLIC BLOOD PRESSURE: 99 MMHG | DIASTOLIC BLOOD PRESSURE: 46 MMHG

## 2019-04-26 VITALS — SYSTOLIC BLOOD PRESSURE: 93 MMHG | DIASTOLIC BLOOD PRESSURE: 59 MMHG

## 2019-04-26 VITALS — DIASTOLIC BLOOD PRESSURE: 58 MMHG | SYSTOLIC BLOOD PRESSURE: 113 MMHG

## 2019-04-26 VITALS — SYSTOLIC BLOOD PRESSURE: 104 MMHG | DIASTOLIC BLOOD PRESSURE: 66 MMHG

## 2019-04-26 VITALS — DIASTOLIC BLOOD PRESSURE: 44 MMHG | SYSTOLIC BLOOD PRESSURE: 96 MMHG

## 2019-04-26 VITALS — SYSTOLIC BLOOD PRESSURE: 89 MMHG | DIASTOLIC BLOOD PRESSURE: 48 MMHG

## 2019-04-26 VITALS — SYSTOLIC BLOOD PRESSURE: 90 MMHG | DIASTOLIC BLOOD PRESSURE: 51 MMHG

## 2019-04-26 VITALS — SYSTOLIC BLOOD PRESSURE: 109 MMHG

## 2019-04-26 LAB
ALBUMIN SERPL-MCNC: 1.5 G/DL (ref 3.4–5)
ALP SERPL-CCNC: 67 U/L (ref 45–117)
ALT SERPL-CCNC: 17 U/L (ref 16–61)
ANION GAP SERPL CALCULATED.3IONS-SCNC: 10 MMOL/L (ref 5–15)
BILIRUB SERPL-MCNC: 1.6 MG/DL (ref 0.2–1)
BUN SERPL-MCNC: 47 MG/DL (ref 7–18)
BUN/CREAT SERPL: 21.5
CALCIUM SERPL-MCNC: 7.1 MG/DL (ref 8.5–10.1)
CHLORIDE SERPL-SCNC: 103 MMOL/L (ref 98–107)
CO2 SERPL-SCNC: 23 MMOL/L (ref 21–32)
GLUCOSE SERPL-MCNC: 199 MG/DL (ref 74–106)
HCT VFR BLD AUTO: 25.5 % (ref 41–53)
HGB BLD-MCNC: 8.7 G/DL (ref 13.5–17.5)
MAGNESIUM SERPL-MCNC: 2.2 MG/DL (ref 1.6–2.6)
MCH RBC QN AUTO: 31.2 PG (ref 28–32)
MCV RBC AUTO: 91.2 FL (ref 80–100)
NRBC BLD QL AUTO: 1 %
POTASSIUM SERPL-SCNC: 3.5 MMOL/L (ref 3.5–5.1)
PROT SERPL-MCNC: 4.2 G/DL (ref 6.4–8.2)
SODIUM SERPL-SCNC: 136 MMOL/L (ref 136–145)

## 2019-04-26 RX ADMIN — MIDAZOLAM SCH MLS/HR: 5 INJECTION, SOLUTION INTRAMUSCULAR; INTRAVENOUS at 04:14

## 2019-04-26 RX ADMIN — PROPOFOL SCH MLS/HR: 10 INJECTION, EMULSION INTRAVENOUS at 08:41

## 2019-04-26 RX ADMIN — HUMAN INSULIN SCH UNITS: 100 INJECTION, SOLUTION SUBCUTANEOUS at 06:31

## 2019-04-26 RX ADMIN — MIDAZOLAM SCH MLS/HR: 5 INJECTION, SOLUTION INTRAMUSCULAR; INTRAVENOUS at 23:44

## 2019-04-26 RX ADMIN — Medication SCH STRIP: at 18:00

## 2019-04-26 RX ADMIN — MEROPENEM SCH MLS/HR: 1 INJECTION, POWDER, FOR SOLUTION INTRAVENOUS at 03:45

## 2019-04-26 RX ADMIN — DEXTROSE SCH MLS/HR: 50 INJECTION, SOLUTION INTRAVENOUS at 10:30

## 2019-04-26 RX ADMIN — MEROPENEM SCH MLS/HR: 1 INJECTION, POWDER, FOR SOLUTION INTRAVENOUS at 16:10

## 2019-04-26 RX ADMIN — DEXTROSE SCH MLS/HR: 5 SOLUTION INTRAVENOUS at 20:00

## 2019-04-26 RX ADMIN — DEXTROSE SCH MLS/HR: 5 SOLUTION INTRAVENOUS at 06:49

## 2019-04-26 RX ADMIN — SODIUM CHLORIDE SCH MLS/HR: 9 INJECTION, SOLUTION INTRAVENOUS at 20:34

## 2019-04-26 RX ADMIN — PANTOPRAZOLE SODIUM SCH MG: 40 INJECTION, POWDER, FOR SOLUTION INTRAVENOUS at 10:07

## 2019-04-26 RX ADMIN — HUMAN INSULIN SCH UNITS: 100 INJECTION, SOLUTION SUBCUTANEOUS at 12:38

## 2019-04-26 RX ADMIN — SODIUM CHLORIDE SCH MLS/HR: 0.9 INJECTION, SOLUTION INTRAVENOUS at 06:15

## 2019-04-26 RX ADMIN — DEXTROSE SCH MLS/HR: 5 SOLUTION INTRAVENOUS at 18:15

## 2019-04-26 RX ADMIN — AMIODARONE HYDROCHLORIDE SCH MLS/HR: 50 INJECTION, SOLUTION INTRAVENOUS at 09:02

## 2019-04-26 RX ADMIN — Medication SCH STRIP: at 12:23

## 2019-04-26 RX ADMIN — HUMAN INSULIN SCH UNITS: 100 INJECTION, SOLUTION SUBCUTANEOUS at 18:00

## 2019-04-26 RX ADMIN — SODIUM CHLORIDE SCH MLS/HR: 0.9 INJECTION, SOLUTION INTRAVENOUS at 08:38

## 2019-04-26 RX ADMIN — Medication SCH STRIP: at 06:27

## 2019-04-26 RX ADMIN — Medication SCH STRIP: at 00:07

## 2019-04-26 RX ADMIN — PROPOFOL SCH MLS/HR: 10 INJECTION, EMULSION INTRAVENOUS at 22:39

## 2019-04-26 RX ADMIN — HUMAN INSULIN SCH UNITS: 100 INJECTION, SOLUTION SUBCUTANEOUS at 00:07

## 2019-04-26 RX ADMIN — SODIUM CHLORIDE SCH MLS/HR: 0.9 INJECTION, SOLUTION INTRAVENOUS at 13:22

## 2019-04-26 NOTE — NUR
Respiratory note:

RECEIVED PT ON VENT V19, ETT TO VENT, VENT CONNECTED TO RED OUTLET AND O2 SOURCE ALARMS ARE 
SET AND AUDIBLE. AMBU BAG AND MASK AT BEDSIDE. BS ARE COURSE T/O SXD SMALL THICK TAN/WHITE 
SECRETIONS, RT NAME AND PAGER ASSIGNMENT WRITTEN ON PTS ROOM BOARD. CURRENT TEMP IS 95.9F. 
WILL CONTINUE TO MONITOR Q2H AND AS NEEDED.

## 2019-04-26 NOTE — NUR
1ST UNIT TRANSFUSED

1ST UNIT OF BLOOD TRANSFUSION COMPLETE. NO S/S OF TRANSFUSION REACTION. WILL HANG 2ND UNIT 
OF BLOOD TRANSFUSION. PATIENT TOLERATED WELL. WILL CONTINUE TO MONITOR.

## 2019-04-26 NOTE — NUR
OPENING NOTE

REPORT RECEIVED FROM CARMEN GILLIAM. ASSUMED CARE OF PATIENT. PATIENT IS INTUBATED AND 
UNRESPONSIVE WITH ET TUBE SIZE 8 AND 24 AT THE LIP. PATIENT IS ON LEVO 30, BAKARI 180, VERSED 
3, DIPRIVAN 15, TPN 60, BICARB 75, AND AMIODARONE 0.5. VENT SETTINGS: A/C MODE, RATE 20, TV 
550, FIO2 100%, PEEP 5. WILL CONTINUE TO MONITOR

## 2019-04-26 NOTE — NUR
2ND UNIT OF TRANSFUSION STARTED

2ND UNIT OF BLOOD TRANSFUSION STARTED. NURSE AT BEDSIDE. WILL CONTINUE TO MONITOR.

## 2019-04-26 NOTE — NUR
CODE STATUS: 



SPOKE WITH SANFORD, PATIENT'S WIFE, ABOUT PATIENT STATUS, AND CURRENT TREATMENT. PER 
SANFORD, IF PATIENT SUFFERS CARDIAC OR RESPIRATORY ARREST, SHE WISHES PATIENT TO BE DNR. BUT 
SHE WOULD LIKE TO CONTINUE CURRENT TREATMENT. SANFORD, SIGNED DNR FORM.

## 2019-04-26 NOTE — NUR
Nutrition Follow-up Notes 



Wt.: 94.1 kg based on bed scale today 

Pt remains intubated, RT at bedside during rounds this morning. Pt's currently sedated with 
Propofol @ 5.715 ml/hr providing 151 kcal from Fat . Pt's NPO, currently on PPN @ 50 ml/hr 
providing 1260 kcal, 60 gms protein, 1020 NPCs. Pt with inadequate PN support d/t low 
initiation rate delivery of less concentrated formula aeb current PN infusion meets 63% to 
76% of est caloric needs (with Propofol on board) and 80% to 100% of est protein needs. 
Noted pt's for active Neurology and Hema/Oncology consults.



Est. Needs: 1850 kcal to 2250 kcal (25-30 kcal/kgBW), 60 gms to 75 gms pro (0.8-1.0 gms/kgBW 
r/t elev RFT). Will continue to monitor pertinent labs and reassess nutrient need prn



Labs: Gluc 199 H, BUN 47 H, Cr 2.19 H, Ca 7.1 L, Tot dany 1.6 H, AST 67 H, Tpro 4.2 L, Alb 
1.5 L; Prealb <3.0 L, Trig 207 H



Skin: Onur scale 12, high risk, pt's sacrum blanchable erythema, R. L ear ? DTI per RN 
documentation. Pls refer to wound care RN's notes 4/23/19 for further details re: tx plans.

 

GI: Pt has no bowel activity since 4/21/19, on OGT to [facility], had gastric drainage 200 ml  this 
morning per RN documentation. 



PES:

Increased nutrient needs r/t current/chronic medical condition aeb intubated, sedated, s/p 
surgery, severe hypoalbuminemia, NPO.

Altered nutrition related lab values r/t current/chronic medical condition aeb 
hyperglycemia, hyponatremia, hypocapnia, hypochloremia, elev. 

renal labs, HbA1c, AST, hypocalcemia and severe hypoalbuminemia



Will continue to monitor NPO status, PN tolerance, skin status, pertinent labs and weight 
trend. F/u in 2 to 3 days.

Rec.: 1.) If still NPO with PN support, consider gradual increase on calories to meet at 
least 75% of needs. 2.) Advance gradually to oral diet when medically appropriate. 3.) Refer 
to CDE/RD for further nutrition education and weight monitoring upon discharged. 4.) 
Continue current plan of care.

## 2019-04-26 NOTE — NUR
PATIENT TOLERATING 2ND UNIT OF BLOOD TRANSFUSION

INCREASED INFUSION FROM 60 ML/HR  ML/HR PER ORDER AFTER 15 MINUTES. PATIENT IS 
TOLERATING TRANSFUSION WELL. NO S/S OF REACTION. WILL CONTINUE TO MONITOR

## 2019-04-26 NOTE — NUR
WOUND CARE NOTE:

Noted photographs taken by bedside nurse of patient's multiple skin integrity issue. Wound 
Care attempted to see patient for skin/wound assessment. Unable to see patient per wound 
care nurse "too unstable" to turn. Patient's family at bedside. Wound care will try to see 
patient at later time.

## 2019-04-26 NOTE — NUR
Dr. Hoffman rounded at 1321.  Examined and chart reviewed.  He spoke with wife Karen and 
extended family, he stated poor prognosis.  DNR was discussed.  At this time still a full 
code.  Family will gather and make a decision.  Will continue with full support.



Dr. Diane, hematologist rounded at 1619.  Discussed condition and reviewed data.  Would 
like 2 units of PLTS to be infused.  Contacted blood bank and he spoke with staff regarding 
the need for the donor to be blood type O.  Blood bank will follow up.

## 2019-04-26 NOTE — NUR
Respiratory note:

AT BEDSIDE FOR ROUTINE VENT CHECK. NO CHANGES MADE AT THIS TIME. PTS FAMILY AT BEDSIDE. BS 
ARE COURSE T/O, SXD VIA ETT FOR MODERATE AMOUNT OF THICK PALE YELLOW, RN VALORIE AT BEDSIDE. 
CURRENT TEMP IS 96.6 F.

## 2019-04-26 NOTE — NUR
Respiratory note:

AT BEDSIDE FOR ROUTINE VENT CHECK. NO CHANGES MADE RN VALORIE AND CHAKA AT BEDSIDE. CURRENT 
TEMP IS 98.6 F. WILL CONTINUE TO MONITOR.

## 2019-04-26 NOTE — NUR
BLOOD TRANSFUSION STARTED

BLOOD TRANSFUSION STARTED.  BLOOD TRANSFUSION BEGAN AT 60 ML/HR PER ORDER. NURSE AT BEDSIDE. 
WILL CONTINUE TO MONITOR

## 2019-04-26 NOTE — NUR
PATIENT TOLERATING BLOOD TRANSFUSION

INCREASED INFUSION  ML/HR PER ORDER. PATIENT IS TOLERATING TRANSFUSION WELL. NO S/S OF 
REACTION. WILL CONTINUE TO MONITOR

## 2019-04-26 NOTE — NUR
Resumed care at 0700, orders reviewed and ongoing assessments being done.  Being treated for 
multiple problems and remains intubated and sedated.  To critical to try sedation vacation.  
Remains on Levophed and Phenylephrine gtt's for BP support, titrating as appropriate, both 
on maximum doses.  Amiodarone remains at .5mg/min, no episodes of AFIB.  Crystal to right 
groin remains intact and patent, no hematoma or bleeding at insertion site.  Both feet and 
both hands remain mottled.  Pulses to upper extremities very weak and barley palpable.  
Unable to palpate pulses on feet, use of Doppler.  



Dr. Bazan rounded at 1210, examined and chart reviewed.  Her spoke with wife Karen and 
extended family at bedside.  Per Dr. Bazan, poor prognosis.  Provided emotional support.

## 2019-04-26 NOTE — NUR
POSITION TURNING

DUE TO PATIENT STATUS, RESPIRATORY, AND DRIPS WE ARE NOT ABLE TO TURN PATIENT DUE TO DECLINE 
IN STATUS WHEN TURNED. WILL CONTINUE TO MONITOR

## 2019-04-26 NOTE — NUR
Respiratory note:

RECEIVING REPORT AT BEDSIDE.  TAWANA MORALES AT BEDSIDE CLEANING PT, RN COMMUNICATED PT 
DESATURATING. POX READING LOW 80S  INCREASED FIO2 FROM 90% %.  TAWANA MORALES AWARE OF 
CHANGE.

## 2019-04-26 NOTE — NUR
FAMILY AT BEDSIDE

SPOKE TO PATIENT'S WIFE ROBERT. UPDATED WIFE ON PATIENT STATUS. WIFE IS STILL SPEAKING TO 
FAMILY AND HAVE NOT COME TO DECISION ON PATIENT STATUS. WIFE IS AT BEDSIDE AT THIS TIME

## 2019-04-27 VITALS — SYSTOLIC BLOOD PRESSURE: 131 MMHG | DIASTOLIC BLOOD PRESSURE: 51 MMHG

## 2019-04-27 VITALS — SYSTOLIC BLOOD PRESSURE: 122 MMHG | DIASTOLIC BLOOD PRESSURE: 49 MMHG

## 2019-04-27 VITALS — DIASTOLIC BLOOD PRESSURE: 52 MMHG | SYSTOLIC BLOOD PRESSURE: 126 MMHG

## 2019-04-27 VITALS — SYSTOLIC BLOOD PRESSURE: 127 MMHG | DIASTOLIC BLOOD PRESSURE: 52 MMHG

## 2019-04-27 VITALS — DIASTOLIC BLOOD PRESSURE: 57 MMHG | SYSTOLIC BLOOD PRESSURE: 136 MMHG

## 2019-04-27 VITALS — SYSTOLIC BLOOD PRESSURE: 108 MMHG | DIASTOLIC BLOOD PRESSURE: 47 MMHG

## 2019-04-27 VITALS — DIASTOLIC BLOOD PRESSURE: 52 MMHG | SYSTOLIC BLOOD PRESSURE: 127 MMHG

## 2019-04-27 VITALS — SYSTOLIC BLOOD PRESSURE: 118 MMHG | DIASTOLIC BLOOD PRESSURE: 41 MMHG

## 2019-04-27 VITALS — DIASTOLIC BLOOD PRESSURE: 48 MMHG | SYSTOLIC BLOOD PRESSURE: 122 MMHG

## 2019-04-27 VITALS — DIASTOLIC BLOOD PRESSURE: 47 MMHG | SYSTOLIC BLOOD PRESSURE: 143 MMHG

## 2019-04-27 VITALS — SYSTOLIC BLOOD PRESSURE: 131 MMHG | DIASTOLIC BLOOD PRESSURE: 52 MMHG

## 2019-04-27 VITALS — DIASTOLIC BLOOD PRESSURE: 56 MMHG | SYSTOLIC BLOOD PRESSURE: 137 MMHG

## 2019-04-27 VITALS — SYSTOLIC BLOOD PRESSURE: 126 MMHG | DIASTOLIC BLOOD PRESSURE: 50 MMHG

## 2019-04-27 VITALS — SYSTOLIC BLOOD PRESSURE: 128 MMHG | DIASTOLIC BLOOD PRESSURE: 50 MMHG

## 2019-04-27 VITALS — SYSTOLIC BLOOD PRESSURE: 112 MMHG | DIASTOLIC BLOOD PRESSURE: 45 MMHG

## 2019-04-27 VITALS — SYSTOLIC BLOOD PRESSURE: 131 MMHG | DIASTOLIC BLOOD PRESSURE: 53 MMHG

## 2019-04-27 VITALS — DIASTOLIC BLOOD PRESSURE: 60 MMHG | SYSTOLIC BLOOD PRESSURE: 148 MMHG

## 2019-04-27 VITALS — SYSTOLIC BLOOD PRESSURE: 119 MMHG | DIASTOLIC BLOOD PRESSURE: 48 MMHG

## 2019-04-27 VITALS — SYSTOLIC BLOOD PRESSURE: 128 MMHG | DIASTOLIC BLOOD PRESSURE: 48 MMHG

## 2019-04-27 VITALS — SYSTOLIC BLOOD PRESSURE: 139 MMHG | DIASTOLIC BLOOD PRESSURE: 53 MMHG

## 2019-04-27 VITALS — DIASTOLIC BLOOD PRESSURE: 53 MMHG | SYSTOLIC BLOOD PRESSURE: 131 MMHG

## 2019-04-27 VITALS — SYSTOLIC BLOOD PRESSURE: 99 MMHG | DIASTOLIC BLOOD PRESSURE: 35 MMHG

## 2019-04-27 VITALS — DIASTOLIC BLOOD PRESSURE: 52 MMHG | SYSTOLIC BLOOD PRESSURE: 135 MMHG

## 2019-04-27 VITALS — DIASTOLIC BLOOD PRESSURE: 48 MMHG | SYSTOLIC BLOOD PRESSURE: 123 MMHG

## 2019-04-27 VITALS — SYSTOLIC BLOOD PRESSURE: 106 MMHG | DIASTOLIC BLOOD PRESSURE: 41 MMHG

## 2019-04-27 VITALS — DIASTOLIC BLOOD PRESSURE: 47 MMHG | SYSTOLIC BLOOD PRESSURE: 128 MMHG

## 2019-04-27 VITALS — DIASTOLIC BLOOD PRESSURE: 43 MMHG | SYSTOLIC BLOOD PRESSURE: 116 MMHG

## 2019-04-27 VITALS — DIASTOLIC BLOOD PRESSURE: 53 MMHG | SYSTOLIC BLOOD PRESSURE: 128 MMHG

## 2019-04-27 VITALS — SYSTOLIC BLOOD PRESSURE: 132 MMHG | DIASTOLIC BLOOD PRESSURE: 48 MMHG

## 2019-04-27 VITALS — DIASTOLIC BLOOD PRESSURE: 43 MMHG | SYSTOLIC BLOOD PRESSURE: 112 MMHG

## 2019-04-27 VITALS — DIASTOLIC BLOOD PRESSURE: 46 MMHG | SYSTOLIC BLOOD PRESSURE: 114 MMHG

## 2019-04-27 VITALS — DIASTOLIC BLOOD PRESSURE: 51 MMHG | SYSTOLIC BLOOD PRESSURE: 126 MMHG

## 2019-04-27 VITALS — DIASTOLIC BLOOD PRESSURE: 52 MMHG | SYSTOLIC BLOOD PRESSURE: 131 MMHG

## 2019-04-27 VITALS — SYSTOLIC BLOOD PRESSURE: 143 MMHG | DIASTOLIC BLOOD PRESSURE: 59 MMHG

## 2019-04-27 VITALS — SYSTOLIC BLOOD PRESSURE: 110 MMHG

## 2019-04-27 VITALS — SYSTOLIC BLOOD PRESSURE: 123 MMHG | DIASTOLIC BLOOD PRESSURE: 53 MMHG

## 2019-04-27 VITALS — SYSTOLIC BLOOD PRESSURE: 105 MMHG | DIASTOLIC BLOOD PRESSURE: 40 MMHG

## 2019-04-27 VITALS — SYSTOLIC BLOOD PRESSURE: 124 MMHG | DIASTOLIC BLOOD PRESSURE: 50 MMHG

## 2019-04-27 VITALS — DIASTOLIC BLOOD PRESSURE: 43 MMHG | SYSTOLIC BLOOD PRESSURE: 93 MMHG

## 2019-04-27 VITALS — DIASTOLIC BLOOD PRESSURE: 49 MMHG | SYSTOLIC BLOOD PRESSURE: 130 MMHG

## 2019-04-27 VITALS — DIASTOLIC BLOOD PRESSURE: 41 MMHG | SYSTOLIC BLOOD PRESSURE: 102 MMHG

## 2019-04-27 VITALS — DIASTOLIC BLOOD PRESSURE: 46 MMHG | SYSTOLIC BLOOD PRESSURE: 143 MMHG

## 2019-04-27 VITALS — DIASTOLIC BLOOD PRESSURE: 52 MMHG | SYSTOLIC BLOOD PRESSURE: 132 MMHG

## 2019-04-27 VITALS — DIASTOLIC BLOOD PRESSURE: 44 MMHG | SYSTOLIC BLOOD PRESSURE: 114 MMHG

## 2019-04-27 VITALS — DIASTOLIC BLOOD PRESSURE: 43 MMHG | SYSTOLIC BLOOD PRESSURE: 115 MMHG

## 2019-04-27 VITALS — DIASTOLIC BLOOD PRESSURE: 48 MMHG | SYSTOLIC BLOOD PRESSURE: 143 MMHG

## 2019-04-27 VITALS — DIASTOLIC BLOOD PRESSURE: 44 MMHG | SYSTOLIC BLOOD PRESSURE: 113 MMHG

## 2019-04-27 VITALS — SYSTOLIC BLOOD PRESSURE: 102 MMHG | DIASTOLIC BLOOD PRESSURE: 48 MMHG

## 2019-04-27 VITALS — SYSTOLIC BLOOD PRESSURE: 120 MMHG | DIASTOLIC BLOOD PRESSURE: 52 MMHG

## 2019-04-27 VITALS — DIASTOLIC BLOOD PRESSURE: 50 MMHG | SYSTOLIC BLOOD PRESSURE: 118 MMHG

## 2019-04-27 VITALS — SYSTOLIC BLOOD PRESSURE: 104 MMHG | DIASTOLIC BLOOD PRESSURE: 48 MMHG

## 2019-04-27 VITALS — DIASTOLIC BLOOD PRESSURE: 47 MMHG | SYSTOLIC BLOOD PRESSURE: 105 MMHG

## 2019-04-27 VITALS — SYSTOLIC BLOOD PRESSURE: 101 MMHG | DIASTOLIC BLOOD PRESSURE: 40 MMHG

## 2019-04-27 VITALS — DIASTOLIC BLOOD PRESSURE: 52 MMHG | SYSTOLIC BLOOD PRESSURE: 128 MMHG

## 2019-04-27 VITALS — SYSTOLIC BLOOD PRESSURE: 131 MMHG | DIASTOLIC BLOOD PRESSURE: 59 MMHG

## 2019-04-27 VITALS — SYSTOLIC BLOOD PRESSURE: 105 MMHG | DIASTOLIC BLOOD PRESSURE: 42 MMHG

## 2019-04-27 VITALS — DIASTOLIC BLOOD PRESSURE: 46 MMHG | SYSTOLIC BLOOD PRESSURE: 118 MMHG

## 2019-04-27 VITALS — SYSTOLIC BLOOD PRESSURE: 105 MMHG | DIASTOLIC BLOOD PRESSURE: 47 MMHG

## 2019-04-27 VITALS — SYSTOLIC BLOOD PRESSURE: 128 MMHG | DIASTOLIC BLOOD PRESSURE: 47 MMHG

## 2019-04-27 VITALS — DIASTOLIC BLOOD PRESSURE: 52 MMHG | SYSTOLIC BLOOD PRESSURE: 130 MMHG

## 2019-04-27 VITALS — DIASTOLIC BLOOD PRESSURE: 48 MMHG | SYSTOLIC BLOOD PRESSURE: 128 MMHG

## 2019-04-27 VITALS — DIASTOLIC BLOOD PRESSURE: 41 MMHG | SYSTOLIC BLOOD PRESSURE: 105 MMHG

## 2019-04-27 VITALS — SYSTOLIC BLOOD PRESSURE: 125 MMHG | DIASTOLIC BLOOD PRESSURE: 50 MMHG

## 2019-04-27 VITALS — SYSTOLIC BLOOD PRESSURE: 130 MMHG | DIASTOLIC BLOOD PRESSURE: 52 MMHG

## 2019-04-27 VITALS — SYSTOLIC BLOOD PRESSURE: 97 MMHG | DIASTOLIC BLOOD PRESSURE: 44 MMHG

## 2019-04-27 VITALS — SYSTOLIC BLOOD PRESSURE: 118 MMHG | DIASTOLIC BLOOD PRESSURE: 42 MMHG

## 2019-04-27 VITALS — SYSTOLIC BLOOD PRESSURE: 130 MMHG | DIASTOLIC BLOOD PRESSURE: 48 MMHG

## 2019-04-27 VITALS — SYSTOLIC BLOOD PRESSURE: 111 MMHG | DIASTOLIC BLOOD PRESSURE: 48 MMHG

## 2019-04-27 VITALS — DIASTOLIC BLOOD PRESSURE: 43 MMHG | SYSTOLIC BLOOD PRESSURE: 120 MMHG

## 2019-04-27 VITALS — DIASTOLIC BLOOD PRESSURE: 52 MMHG | SYSTOLIC BLOOD PRESSURE: 116 MMHG

## 2019-04-27 VITALS — SYSTOLIC BLOOD PRESSURE: 115 MMHG | DIASTOLIC BLOOD PRESSURE: 57 MMHG

## 2019-04-27 VITALS — DIASTOLIC BLOOD PRESSURE: 46 MMHG | SYSTOLIC BLOOD PRESSURE: 115 MMHG

## 2019-04-27 VITALS — SYSTOLIC BLOOD PRESSURE: 123 MMHG | DIASTOLIC BLOOD PRESSURE: 49 MMHG

## 2019-04-27 VITALS — DIASTOLIC BLOOD PRESSURE: 56 MMHG | SYSTOLIC BLOOD PRESSURE: 124 MMHG

## 2019-04-27 VITALS — DIASTOLIC BLOOD PRESSURE: 45 MMHG | SYSTOLIC BLOOD PRESSURE: 121 MMHG

## 2019-04-27 VITALS — SYSTOLIC BLOOD PRESSURE: 110 MMHG | DIASTOLIC BLOOD PRESSURE: 45 MMHG

## 2019-04-27 VITALS — SYSTOLIC BLOOD PRESSURE: 134 MMHG | DIASTOLIC BLOOD PRESSURE: 52 MMHG

## 2019-04-27 VITALS — DIASTOLIC BLOOD PRESSURE: 45 MMHG | SYSTOLIC BLOOD PRESSURE: 110 MMHG

## 2019-04-27 VITALS — DIASTOLIC BLOOD PRESSURE: 54 MMHG | SYSTOLIC BLOOD PRESSURE: 125 MMHG

## 2019-04-27 VITALS — SYSTOLIC BLOOD PRESSURE: 94 MMHG | DIASTOLIC BLOOD PRESSURE: 43 MMHG

## 2019-04-27 VITALS — SYSTOLIC BLOOD PRESSURE: 118 MMHG | DIASTOLIC BLOOD PRESSURE: 43 MMHG

## 2019-04-27 VITALS — DIASTOLIC BLOOD PRESSURE: 49 MMHG | SYSTOLIC BLOOD PRESSURE: 121 MMHG

## 2019-04-27 VITALS — DIASTOLIC BLOOD PRESSURE: 55 MMHG | SYSTOLIC BLOOD PRESSURE: 143 MMHG

## 2019-04-27 VITALS — DIASTOLIC BLOOD PRESSURE: 52 MMHG | SYSTOLIC BLOOD PRESSURE: 119 MMHG

## 2019-04-27 VITALS — SYSTOLIC BLOOD PRESSURE: 109 MMHG | DIASTOLIC BLOOD PRESSURE: 46 MMHG

## 2019-04-27 VITALS — SYSTOLIC BLOOD PRESSURE: 119 MMHG | DIASTOLIC BLOOD PRESSURE: 52 MMHG

## 2019-04-27 VITALS — SYSTOLIC BLOOD PRESSURE: 127 MMHG | DIASTOLIC BLOOD PRESSURE: 50 MMHG

## 2019-04-27 VITALS — DIASTOLIC BLOOD PRESSURE: 48 MMHG | SYSTOLIC BLOOD PRESSURE: 133 MMHG

## 2019-04-27 VITALS — SYSTOLIC BLOOD PRESSURE: 124 MMHG | DIASTOLIC BLOOD PRESSURE: 49 MMHG

## 2019-04-27 VITALS — DIASTOLIC BLOOD PRESSURE: 46 MMHG | SYSTOLIC BLOOD PRESSURE: 116 MMHG

## 2019-04-27 VITALS — DIASTOLIC BLOOD PRESSURE: 49 MMHG | SYSTOLIC BLOOD PRESSURE: 118 MMHG

## 2019-04-27 VITALS — SYSTOLIC BLOOD PRESSURE: 94 MMHG | DIASTOLIC BLOOD PRESSURE: 42 MMHG

## 2019-04-27 VITALS — SYSTOLIC BLOOD PRESSURE: 131 MMHG | DIASTOLIC BLOOD PRESSURE: 50 MMHG

## 2019-04-27 VITALS — DIASTOLIC BLOOD PRESSURE: 45 MMHG

## 2019-04-27 VITALS — SYSTOLIC BLOOD PRESSURE: 136 MMHG | DIASTOLIC BLOOD PRESSURE: 53 MMHG

## 2019-04-27 VITALS — DIASTOLIC BLOOD PRESSURE: 36 MMHG | SYSTOLIC BLOOD PRESSURE: 93 MMHG

## 2019-04-27 VITALS — DIASTOLIC BLOOD PRESSURE: 59 MMHG | SYSTOLIC BLOOD PRESSURE: 131 MMHG

## 2019-04-27 VITALS — DIASTOLIC BLOOD PRESSURE: 49 MMHG | SYSTOLIC BLOOD PRESSURE: 122 MMHG

## 2019-04-27 VITALS — DIASTOLIC BLOOD PRESSURE: 45 MMHG | SYSTOLIC BLOOD PRESSURE: 112 MMHG

## 2019-04-27 VITALS — SYSTOLIC BLOOD PRESSURE: 124 MMHG | DIASTOLIC BLOOD PRESSURE: 56 MMHG

## 2019-04-27 VITALS — DIASTOLIC BLOOD PRESSURE: 45 MMHG | SYSTOLIC BLOOD PRESSURE: 119 MMHG

## 2019-04-27 LAB
ALBUMIN SERPL-MCNC: 1.5 G/DL (ref 3.4–5)
ALP SERPL-CCNC: 68 U/L (ref 45–117)
ALT SERPL-CCNC: 17 U/L (ref 16–61)
ANION GAP SERPL CALCULATED.3IONS-SCNC: 11 MMOL/L (ref 5–15)
BILIRUB SERPL-MCNC: 2.9 MG/DL (ref 0.2–1)
BUN SERPL-MCNC: 54 MG/DL (ref 7–18)
BUN/CREAT SERPL: 24.7
CALCIUM SERPL-MCNC: 7.5 MG/DL (ref 8.5–10.1)
CHLORIDE SERPL-SCNC: 104 MMOL/L (ref 98–107)
CO2 SERPL-SCNC: 22 MMOL/L (ref 21–32)
GLUCOSE SERPL-MCNC: 239 MG/DL (ref 74–106)
HCT VFR BLD AUTO: 25.3 % (ref 41–53)
HGB BLD-MCNC: 8.6 G/DL (ref 13.5–17.5)
MAGNESIUM SERPL-MCNC: 2.2 MG/DL (ref 1.6–2.6)
MCH RBC QN AUTO: 31.3 PG (ref 28–32)
MCV RBC AUTO: 91.9 FL (ref 80–100)
NRBC BLD QL AUTO: 2 %
POTASSIUM SERPL-SCNC: 3.7 MMOL/L (ref 3.5–5.1)
PROT SERPL-MCNC: 4.1 G/DL (ref 6.4–8.2)
SODIUM SERPL-SCNC: 137 MMOL/L (ref 136–145)
TRIGL SERPL-MCNC: 94 MG/DL (ref ?–150)

## 2019-04-27 RX ADMIN — AMIODARONE HYDROCHLORIDE SCH MLS/HR: 50 INJECTION, SOLUTION INTRAVENOUS at 17:00

## 2019-04-27 RX ADMIN — Medication SCH STRIP: at 18:00

## 2019-04-27 RX ADMIN — SODIUM CHLORIDE SCH MLS/HR: 0.9 INJECTION, SOLUTION INTRAVENOUS at 22:14

## 2019-04-27 RX ADMIN — ALBUTEROL SULFATE PRN MG: 2.5 SOLUTION RESPIRATORY (INHALATION) at 19:54

## 2019-04-27 RX ADMIN — HUMAN INSULIN SCH UNITS: 100 INJECTION, SOLUTION SUBCUTANEOUS at 06:21

## 2019-04-27 RX ADMIN — Medication SCH STRIP: at 06:21

## 2019-04-27 RX ADMIN — IPRATROPIUM BROMIDE PRN MG: 0.5 SOLUTION RESPIRATORY (INHALATION) at 19:54

## 2019-04-27 RX ADMIN — SODIUM CHLORIDE SCH MLS/HR: 9 INJECTION, SOLUTION INTRAVENOUS at 19:52

## 2019-04-27 RX ADMIN — HUMAN INSULIN SCH UNITS: 100 INJECTION, SOLUTION SUBCUTANEOUS at 18:54

## 2019-04-27 RX ADMIN — MEROPENEM SCH MLS/HR: 1 INJECTION, POWDER, FOR SOLUTION INTRAVENOUS at 04:27

## 2019-04-27 RX ADMIN — FENTANYL CITRATE SCH MLS/HR: 50 INJECTION, SOLUTION INTRAMUSCULAR; INTRAVENOUS at 15:30

## 2019-04-27 RX ADMIN — HUMAN INSULIN SCH UNITS: 100 INJECTION, SOLUTION SUBCUTANEOUS at 12:45

## 2019-04-27 RX ADMIN — PANTOPRAZOLE SODIUM SCH MG: 40 INJECTION, POWDER, FOR SOLUTION INTRAVENOUS at 10:00

## 2019-04-27 RX ADMIN — IPRATROPIUM BROMIDE PRN MG: 0.5 SOLUTION RESPIRATORY (INHALATION) at 02:55

## 2019-04-27 RX ADMIN — SODIUM CHLORIDE SCH MLS/HR: 0.9 INJECTION, SOLUTION INTRAVENOUS at 05:23

## 2019-04-27 RX ADMIN — Medication SCH STRIP: at 00:22

## 2019-04-27 RX ADMIN — MEROPENEM SCH MLS/HR: 1 INJECTION, POWDER, FOR SOLUTION INTRAVENOUS at 18:00

## 2019-04-27 RX ADMIN — HUMAN INSULIN SCH UNITS: 100 INJECTION, SOLUTION SUBCUTANEOUS at 00:21

## 2019-04-27 RX ADMIN — SODIUM CHLORIDE SCH MLS/HR: 0.9 INJECTION, SOLUTION INTRAVENOUS at 03:14

## 2019-04-27 RX ADMIN — ALBUTEROL SULFATE PRN MG: 2.5 SOLUTION RESPIRATORY (INHALATION) at 02:55

## 2019-04-27 RX ADMIN — SODIUM CHLORIDE SCH MLS/HR: 9 INJECTION, SOLUTION INTRAVENOUS at 13:30

## 2019-04-27 RX ADMIN — DEXTROSE SCH MLS/HR: 50 INJECTION, SOLUTION INTRAVENOUS at 10:30

## 2019-04-27 RX ADMIN — Medication SCH STRIP: at 12:18

## 2019-04-27 NOTE — NUR
SPOKE WITH PATIENTS WIFE ROBERT, SHE STATES SHE DOES NOT WANT ANY OTHER VASOPRESSORS ADDED IF 
PATIENTS BP DECLINES. ONLY LEVOPHED AND BAKARI

## 2019-04-27 NOTE — NUR
ASSESSED PATIENTS BACK AND SACRAL AREA, NOTED PRESSURE ARE AND DISCOLORATION, DTI. UNABLE TO 
TAKE PICTURE DUE TO UNABLE TO STAY TURNED DUE TO HEMODYNAMICS. OXYGEN SATURATION DECLINES TO 
88%, BLOOD PRESSURE DECLINES TO SBP 90S

APPLIED OPTIFOAM

## 2019-04-27 NOTE — NUR
LINEN AND POSITION CHANGE

LINENS CHANGED, POSITION CHANGED AND MOVED UP IN BED. PATIENT TOLERATED POSITION CHANGE 
WELL. WILL CONTINUE TO MONITOR

## 2019-04-27 NOTE — NUR
SPOKE WITH RIP MAHAN NP:



NOTIFIED RIP MAHAN ABOUT SpO2< 90% DESPITE LASIX AND MED NEBS BE GIVEN. ORDERS TO 
INCREASE PEEP TO 8. ORDERS READBACK AND VERIFIED

## 2019-04-27 NOTE — NUR
2ND UNIT TRANSFUSION COMPLETE

PATIENT TOLERATED TRANSFUSION WELL. NO S/S OF REACTION. WILL CONTINUE TO MONITOR

## 2019-04-27 NOTE — NUR
WOUND CARE NOTE:

Wound care in to see patient for skin assessment. Patient developed new skin integrity 
issue, bedside nurse took photographs for reference. Patient continue resting in  ICU bed in 
 Rm. 107. He's still intubated, sedated and mechanically ventilated. Patient still too 
unstable to turn per RN's report. His current Onur score is 8. Unable to turn patient to 
assess sacral and back bony prominences.



Patient has generalized edema and developed multiple serum filled blister to arms,hand and 
feet, which some opened up to partial thickness skin tears. Bedside nurse covered multiple 
skin tears with Opti foam gentle dressing per MD order. His limbs, particularly the hand, 
digits, plantar foot and toes has purple discoloration and so the lateral ear pinna with 
multi serum filled blisters. Patient also developed scrotal edema with weeping skin tears 
with moderate serosanguineous drainage.  Bedside nurse managing moisture with application of 
absorbent pad.  Patient is on TPN and Dietary is on board.



RECOMMENDATION:

Continuation of all wound care orders prescribed by MD, continue with skin/wound plan of 
care, Continue monitoring by Wound care while patient is  mechanically ventilated.

## 2019-04-27 NOTE — NUR
DIPRIVAN WASTED:



WASTED 90 ML OF DIPRIVAN INTO SECURED CONTAINER IN MED ROOM. WITNESSED BY TAWANA VILLANUEVA

## 2019-04-27 NOTE — NUR
SPOKE WITH RIP, NP:



LUNG SOUNDS COARSE, DESATURATION TO MID 80s. X RAY SHOWS INCREASED INFILTRATES OR EDEMA. 
POOR UOP. ORDERS FOR 20 MG LASIX IVP X1 DOSE. ORDERS READBACK AND VERIFIED

## 2019-04-27 NOTE — NUR
ABDOMINAL DRESSING CHANGED

ABDOMINAL INCISION CLEAN, DRY, INTACT, NO DRAINAGE, WELL APPROXIMATED, AND STAPLES INTACT, 
CLEANSED WITH CHLORHEXIDINE SWABS, COVERED WITH GAUZE, AND SEALED WITH MEDIPORE. PATIENT 
TOLERATED PROCEDURE WELL.

## 2019-04-27 NOTE — NUR
Respiratory note:

AT BEDSIDE FOR ROUTINE VENT CHECK. NO CHANGES MADE RN VALORIE AT BEDSIDE. WILL CONTINUE TO 
MONITOR.

## 2019-04-27 NOTE — NUR
UPDATED FAMILY:



SPOKE WITH SANFORD, WIFE, AFTER PW VERIFIED. UPDATED ON STATUS. ANSWERED ALL QUESTIONS AS 
ABLE

## 2019-04-27 NOTE — NUR
Respiratory note:

AT BEDSIDE FOR ROUTINE VENT CHECK. BS ARE COURSE T/O, SXD MODERATE AMOUNT OF THICK CREAMY 
TAN. MED NEB TX GIVEN INLINE WITHOUT ADVERSE REACTION NOTED. CURRENT TEMP IS 98.8 F.  TAWANA NULL AT 

BEDSIDE. WILL CONTINUE TO MONITOR.

## 2019-04-27 NOTE — NUR
OPENING NOTE:



INTUBATED AND SEDATED. NO PAIN BEHAVIORS IDENTIFIED. NSR, HR 80s. A-LINE PRESSURES 110s, MAP 
>65; NIBP 110s, ON LEVO AND BAKARI GTT. 8.0 ETT, 24 AT THE LIP. COARSE AND WHEEZING LUNG SOUNDS 
THROUGHOUT, DIMINISHED TO BASES. EVEN AND UNLABORED BREATHING. SpO2> 92% ON CURRENT VENT 
SETTINGS. CREAMY ETT SECRETIONS, BLOODY ORAL SECRETIONS. CRUSTED DRIED OLD BLOOD TO LIPS. 
ABD FIRM, DISTENDED. MLI WITH STAPLES, DRESSING INTACT. ELY WITH SEROSANG DRAINAGE. OGT LCS, 
THICK MUCOID BILIOUS. PASCUAL PATENT AND INTACT, DRAINING MINIMAL ADRIAN URINE. SCROTUM WITH 
EXTREME SWELLING, OOZING SEROSANG DRAINAGE. MULTIPLE SKIN WOUNDS NOTED THROUGHOUT. SEE SKIN 
AND WOUND FLOWSHEET FOR ASSESSMENT. RIGHT IJ CVC, PATENT WITH BLOOD RETURN, CDI. LEFT UPPER 
ARM MIDLINE, PATENT WITH BLOOD RETURN AND CDI. 



REINFORCED POC. MAINTAINED PATIENT SAFETY: BED LOCKED AND IN THE LOWEST POSITION. FREQUENT 
VISUAL CHECKS.

## 2019-04-27 NOTE — NUR
TURNING HELD AT THIS TIME DUE TO UNSTABLE HEMODYNAMICS, ON MAX RATED OF 2 VASOPRESSORS AND 
FIO2 %. WILL CONTINUE TO REASSESS

## 2019-04-27 NOTE — NUR
DR DANIELS AT BEDSIDE

UPDATED ON FAMILY CHANGING CODE STATUS LAST NIGHT. NO NEW ORDERS AT THIS TIME

## 2019-04-28 VITALS — DIASTOLIC BLOOD PRESSURE: 41 MMHG | SYSTOLIC BLOOD PRESSURE: 105 MMHG

## 2019-04-28 VITALS — DIASTOLIC BLOOD PRESSURE: 38 MMHG | SYSTOLIC BLOOD PRESSURE: 117 MMHG

## 2019-04-28 VITALS — DIASTOLIC BLOOD PRESSURE: 46 MMHG | SYSTOLIC BLOOD PRESSURE: 148 MMHG

## 2019-04-28 VITALS — DIASTOLIC BLOOD PRESSURE: 49 MMHG | SYSTOLIC BLOOD PRESSURE: 127 MMHG

## 2019-04-28 VITALS — SYSTOLIC BLOOD PRESSURE: 111 MMHG | DIASTOLIC BLOOD PRESSURE: 39 MMHG

## 2019-04-28 VITALS — SYSTOLIC BLOOD PRESSURE: 100 MMHG | DIASTOLIC BLOOD PRESSURE: 41 MMHG

## 2019-04-28 VITALS — DIASTOLIC BLOOD PRESSURE: 45 MMHG | SYSTOLIC BLOOD PRESSURE: 128 MMHG

## 2019-04-28 VITALS — SYSTOLIC BLOOD PRESSURE: 128 MMHG | DIASTOLIC BLOOD PRESSURE: 45 MMHG

## 2019-04-28 VITALS — DIASTOLIC BLOOD PRESSURE: 44 MMHG | SYSTOLIC BLOOD PRESSURE: 132 MMHG

## 2019-04-28 VITALS — SYSTOLIC BLOOD PRESSURE: 119 MMHG | DIASTOLIC BLOOD PRESSURE: 43 MMHG

## 2019-04-28 VITALS — DIASTOLIC BLOOD PRESSURE: 40 MMHG | SYSTOLIC BLOOD PRESSURE: 118 MMHG

## 2019-04-28 VITALS — SYSTOLIC BLOOD PRESSURE: 152 MMHG | DIASTOLIC BLOOD PRESSURE: 47 MMHG

## 2019-04-28 VITALS — DIASTOLIC BLOOD PRESSURE: 42 MMHG | SYSTOLIC BLOOD PRESSURE: 113 MMHG

## 2019-04-28 VITALS — SYSTOLIC BLOOD PRESSURE: 123 MMHG | DIASTOLIC BLOOD PRESSURE: 43 MMHG

## 2019-04-28 VITALS — SYSTOLIC BLOOD PRESSURE: 110 MMHG | DIASTOLIC BLOOD PRESSURE: 43 MMHG

## 2019-04-28 VITALS — SYSTOLIC BLOOD PRESSURE: 120 MMHG | DIASTOLIC BLOOD PRESSURE: 41 MMHG

## 2019-04-28 VITALS — DIASTOLIC BLOOD PRESSURE: 41 MMHG | SYSTOLIC BLOOD PRESSURE: 108 MMHG

## 2019-04-28 VITALS — SYSTOLIC BLOOD PRESSURE: 148 MMHG | DIASTOLIC BLOOD PRESSURE: 54 MMHG

## 2019-04-28 VITALS — DIASTOLIC BLOOD PRESSURE: 60 MMHG | SYSTOLIC BLOOD PRESSURE: 153 MMHG

## 2019-04-28 VITALS — SYSTOLIC BLOOD PRESSURE: 111 MMHG | DIASTOLIC BLOOD PRESSURE: 38 MMHG

## 2019-04-28 VITALS — SYSTOLIC BLOOD PRESSURE: 122 MMHG | DIASTOLIC BLOOD PRESSURE: 42 MMHG

## 2019-04-28 VITALS — SYSTOLIC BLOOD PRESSURE: 123 MMHG | DIASTOLIC BLOOD PRESSURE: 45 MMHG

## 2019-04-28 VITALS — SYSTOLIC BLOOD PRESSURE: 105 MMHG | DIASTOLIC BLOOD PRESSURE: 40 MMHG

## 2019-04-28 VITALS — DIASTOLIC BLOOD PRESSURE: 39 MMHG | SYSTOLIC BLOOD PRESSURE: 118 MMHG

## 2019-04-28 VITALS — SYSTOLIC BLOOD PRESSURE: 137 MMHG | DIASTOLIC BLOOD PRESSURE: 42 MMHG

## 2019-04-28 VITALS — DIASTOLIC BLOOD PRESSURE: 40 MMHG | SYSTOLIC BLOOD PRESSURE: 94 MMHG

## 2019-04-28 VITALS — SYSTOLIC BLOOD PRESSURE: 123 MMHG | DIASTOLIC BLOOD PRESSURE: 47 MMHG

## 2019-04-28 VITALS — DIASTOLIC BLOOD PRESSURE: 47 MMHG | SYSTOLIC BLOOD PRESSURE: 136 MMHG

## 2019-04-28 VITALS — SYSTOLIC BLOOD PRESSURE: 128 MMHG | DIASTOLIC BLOOD PRESSURE: 42 MMHG

## 2019-04-28 VITALS — DIASTOLIC BLOOD PRESSURE: 42 MMHG | SYSTOLIC BLOOD PRESSURE: 129 MMHG

## 2019-04-28 VITALS — SYSTOLIC BLOOD PRESSURE: 127 MMHG | DIASTOLIC BLOOD PRESSURE: 44 MMHG

## 2019-04-28 VITALS — DIASTOLIC BLOOD PRESSURE: 41 MMHG | SYSTOLIC BLOOD PRESSURE: 130 MMHG

## 2019-04-28 VITALS — SYSTOLIC BLOOD PRESSURE: 131 MMHG | DIASTOLIC BLOOD PRESSURE: 41 MMHG

## 2019-04-28 VITALS — DIASTOLIC BLOOD PRESSURE: 61 MMHG | SYSTOLIC BLOOD PRESSURE: 136 MMHG

## 2019-04-28 VITALS — DIASTOLIC BLOOD PRESSURE: 54 MMHG | SYSTOLIC BLOOD PRESSURE: 134 MMHG

## 2019-04-28 VITALS — SYSTOLIC BLOOD PRESSURE: 124 MMHG | DIASTOLIC BLOOD PRESSURE: 52 MMHG

## 2019-04-28 VITALS — SYSTOLIC BLOOD PRESSURE: 131 MMHG | DIASTOLIC BLOOD PRESSURE: 42 MMHG

## 2019-04-28 VITALS — SYSTOLIC BLOOD PRESSURE: 123 MMHG | DIASTOLIC BLOOD PRESSURE: 41 MMHG

## 2019-04-28 VITALS — DIASTOLIC BLOOD PRESSURE: 41 MMHG | SYSTOLIC BLOOD PRESSURE: 100 MMHG

## 2019-04-28 VITALS — SYSTOLIC BLOOD PRESSURE: 117 MMHG | DIASTOLIC BLOOD PRESSURE: 43 MMHG

## 2019-04-28 VITALS — DIASTOLIC BLOOD PRESSURE: 43 MMHG | SYSTOLIC BLOOD PRESSURE: 102 MMHG

## 2019-04-28 VITALS — DIASTOLIC BLOOD PRESSURE: 52 MMHG | SYSTOLIC BLOOD PRESSURE: 124 MMHG

## 2019-04-28 VITALS — SYSTOLIC BLOOD PRESSURE: 140 MMHG | DIASTOLIC BLOOD PRESSURE: 51 MMHG

## 2019-04-28 VITALS — SYSTOLIC BLOOD PRESSURE: 112 MMHG | DIASTOLIC BLOOD PRESSURE: 42 MMHG

## 2019-04-28 VITALS — SYSTOLIC BLOOD PRESSURE: 131 MMHG | DIASTOLIC BLOOD PRESSURE: 43 MMHG

## 2019-04-28 VITALS — SYSTOLIC BLOOD PRESSURE: 131 MMHG | DIASTOLIC BLOOD PRESSURE: 44 MMHG

## 2019-04-28 VITALS — DIASTOLIC BLOOD PRESSURE: 61 MMHG | SYSTOLIC BLOOD PRESSURE: 119 MMHG

## 2019-04-28 VITALS — SYSTOLIC BLOOD PRESSURE: 123 MMHG | DIASTOLIC BLOOD PRESSURE: 40 MMHG

## 2019-04-28 VITALS — DIASTOLIC BLOOD PRESSURE: 37 MMHG | SYSTOLIC BLOOD PRESSURE: 106 MMHG

## 2019-04-28 VITALS — SYSTOLIC BLOOD PRESSURE: 114 MMHG | DIASTOLIC BLOOD PRESSURE: 43 MMHG

## 2019-04-28 VITALS — SYSTOLIC BLOOD PRESSURE: 136 MMHG | DIASTOLIC BLOOD PRESSURE: 60 MMHG

## 2019-04-28 VITALS — SYSTOLIC BLOOD PRESSURE: 114 MMHG | DIASTOLIC BLOOD PRESSURE: 42 MMHG

## 2019-04-28 VITALS — SYSTOLIC BLOOD PRESSURE: 110 MMHG | DIASTOLIC BLOOD PRESSURE: 42 MMHG

## 2019-04-28 VITALS — DIASTOLIC BLOOD PRESSURE: 42 MMHG | SYSTOLIC BLOOD PRESSURE: 116 MMHG

## 2019-04-28 VITALS — SYSTOLIC BLOOD PRESSURE: 124 MMHG | DIASTOLIC BLOOD PRESSURE: 41 MMHG

## 2019-04-28 VITALS — DIASTOLIC BLOOD PRESSURE: 40 MMHG | SYSTOLIC BLOOD PRESSURE: 105 MMHG

## 2019-04-28 VITALS — SYSTOLIC BLOOD PRESSURE: 118 MMHG | DIASTOLIC BLOOD PRESSURE: 48 MMHG

## 2019-04-28 VITALS — DIASTOLIC BLOOD PRESSURE: 56 MMHG | SYSTOLIC BLOOD PRESSURE: 109 MMHG

## 2019-04-28 VITALS — DIASTOLIC BLOOD PRESSURE: 41 MMHG | SYSTOLIC BLOOD PRESSURE: 113 MMHG

## 2019-04-28 VITALS — SYSTOLIC BLOOD PRESSURE: 125 MMHG | DIASTOLIC BLOOD PRESSURE: 45 MMHG

## 2019-04-28 VITALS — DIASTOLIC BLOOD PRESSURE: 44 MMHG | SYSTOLIC BLOOD PRESSURE: 140 MMHG

## 2019-04-28 VITALS — SYSTOLIC BLOOD PRESSURE: 122 MMHG | DIASTOLIC BLOOD PRESSURE: 51 MMHG

## 2019-04-28 VITALS — DIASTOLIC BLOOD PRESSURE: 39 MMHG | SYSTOLIC BLOOD PRESSURE: 132 MMHG

## 2019-04-28 VITALS — DIASTOLIC BLOOD PRESSURE: 42 MMHG | SYSTOLIC BLOOD PRESSURE: 110 MMHG

## 2019-04-28 VITALS — SYSTOLIC BLOOD PRESSURE: 122 MMHG | DIASTOLIC BLOOD PRESSURE: 43 MMHG

## 2019-04-28 VITALS — DIASTOLIC BLOOD PRESSURE: 47 MMHG | SYSTOLIC BLOOD PRESSURE: 118 MMHG

## 2019-04-28 VITALS — DIASTOLIC BLOOD PRESSURE: 41 MMHG | SYSTOLIC BLOOD PRESSURE: 131 MMHG

## 2019-04-28 VITALS — SYSTOLIC BLOOD PRESSURE: 113 MMHG | DIASTOLIC BLOOD PRESSURE: 40 MMHG

## 2019-04-28 VITALS — SYSTOLIC BLOOD PRESSURE: 118 MMHG | DIASTOLIC BLOOD PRESSURE: 40 MMHG

## 2019-04-28 VITALS — DIASTOLIC BLOOD PRESSURE: 45 MMHG | SYSTOLIC BLOOD PRESSURE: 116 MMHG

## 2019-04-28 VITALS — DIASTOLIC BLOOD PRESSURE: 43 MMHG | SYSTOLIC BLOOD PRESSURE: 113 MMHG

## 2019-04-28 VITALS — DIASTOLIC BLOOD PRESSURE: 39 MMHG | SYSTOLIC BLOOD PRESSURE: 106 MMHG

## 2019-04-28 VITALS — SYSTOLIC BLOOD PRESSURE: 135 MMHG | DIASTOLIC BLOOD PRESSURE: 40 MMHG

## 2019-04-28 VITALS — DIASTOLIC BLOOD PRESSURE: 41 MMHG | SYSTOLIC BLOOD PRESSURE: 110 MMHG

## 2019-04-28 VITALS — DIASTOLIC BLOOD PRESSURE: 41 MMHG | SYSTOLIC BLOOD PRESSURE: 127 MMHG

## 2019-04-28 VITALS — DIASTOLIC BLOOD PRESSURE: 47 MMHG | SYSTOLIC BLOOD PRESSURE: 131 MMHG

## 2019-04-28 VITALS — DIASTOLIC BLOOD PRESSURE: 40 MMHG | SYSTOLIC BLOOD PRESSURE: 108 MMHG

## 2019-04-28 VITALS — DIASTOLIC BLOOD PRESSURE: 41 MMHG | SYSTOLIC BLOOD PRESSURE: 135 MMHG

## 2019-04-28 VITALS — DIASTOLIC BLOOD PRESSURE: 40 MMHG | SYSTOLIC BLOOD PRESSURE: 126 MMHG

## 2019-04-28 VITALS — SYSTOLIC BLOOD PRESSURE: 113 MMHG | DIASTOLIC BLOOD PRESSURE: 43 MMHG

## 2019-04-28 VITALS — DIASTOLIC BLOOD PRESSURE: 49 MMHG | SYSTOLIC BLOOD PRESSURE: 129 MMHG

## 2019-04-28 VITALS — DIASTOLIC BLOOD PRESSURE: 38 MMHG | SYSTOLIC BLOOD PRESSURE: 119 MMHG

## 2019-04-28 VITALS — SYSTOLIC BLOOD PRESSURE: 143 MMHG | DIASTOLIC BLOOD PRESSURE: 51 MMHG

## 2019-04-28 VITALS — DIASTOLIC BLOOD PRESSURE: 42 MMHG | SYSTOLIC BLOOD PRESSURE: 118 MMHG

## 2019-04-28 VITALS — DIASTOLIC BLOOD PRESSURE: 40 MMHG | SYSTOLIC BLOOD PRESSURE: 115 MMHG

## 2019-04-28 VITALS — SYSTOLIC BLOOD PRESSURE: 105 MMHG | DIASTOLIC BLOOD PRESSURE: 48 MMHG

## 2019-04-28 VITALS — SYSTOLIC BLOOD PRESSURE: 118 MMHG | DIASTOLIC BLOOD PRESSURE: 51 MMHG

## 2019-04-28 VITALS — DIASTOLIC BLOOD PRESSURE: 41 MMHG | SYSTOLIC BLOOD PRESSURE: 129 MMHG

## 2019-04-28 LAB
ALBUMIN SERPL-MCNC: 1.1 G/DL (ref 3.4–5)
ALP SERPL-CCNC: 73 U/L (ref 45–117)
ALT SERPL-CCNC: 13 U/L (ref 16–61)
ANION GAP SERPL CALCULATED.3IONS-SCNC: 11 MMOL/L (ref 5–15)
BILIRUB SERPL-MCNC: 4.5 MG/DL (ref 0.2–1)
BUN SERPL-MCNC: 68 MG/DL (ref 7–18)
BUN/CREAT SERPL: 27.2
CALCIUM SERPL-MCNC: 7.7 MG/DL (ref 8.5–10.1)
CHLORIDE SERPL-SCNC: 104 MMOL/L (ref 98–107)
CO2 SERPL-SCNC: 22 MMOL/L (ref 21–32)
GLUCOSE SERPL-MCNC: 163 MG/DL (ref 74–106)
HCT VFR BLD AUTO: 24.8 % (ref 41–53)
HGB BLD-MCNC: 8.4 G/DL (ref 13.5–17.5)
MAGNESIUM SERPL-MCNC: 2.7 MG/DL (ref 1.6–2.6)
MCH RBC QN AUTO: 31.3 PG (ref 28–32)
MCV RBC AUTO: 92.7 FL (ref 80–100)
POTASSIUM SERPL-SCNC: 4.3 MMOL/L (ref 3.5–5.1)
PROT SERPL-MCNC: 3.8 G/DL (ref 6.4–8.2)
SODIUM SERPL-SCNC: 137 MMOL/L (ref 136–145)

## 2019-04-28 RX ADMIN — DEXTROSE SCH MLS/HR: 50 INJECTION, SOLUTION INTRAVENOUS at 10:30

## 2019-04-28 RX ADMIN — SODIUM CHLORIDE SCH MLS/HR: 0.9 INJECTION, SOLUTION INTRAVENOUS at 01:04

## 2019-04-28 RX ADMIN — Medication SCH STRIP: at 12:00

## 2019-04-28 RX ADMIN — HUMAN INSULIN SCH UNITS: 100 INJECTION, SOLUTION SUBCUTANEOUS at 00:09

## 2019-04-28 RX ADMIN — MEROPENEM SCH MLS/HR: 1 INJECTION, POWDER, FOR SOLUTION INTRAVENOUS at 19:24

## 2019-04-28 RX ADMIN — MEROPENEM SCH MLS/HR: 1 INJECTION, POWDER, FOR SOLUTION INTRAVENOUS at 03:36

## 2019-04-28 RX ADMIN — IPRATROPIUM BROMIDE PRN MG: 0.5 SOLUTION RESPIRATORY (INHALATION) at 06:28

## 2019-04-28 RX ADMIN — ALBUTEROL SULFATE PRN MG: 2.5 SOLUTION RESPIRATORY (INHALATION) at 06:28

## 2019-04-28 RX ADMIN — HUMAN INSULIN SCH UNITS: 100 INJECTION, SOLUTION SUBCUTANEOUS at 12:00

## 2019-04-28 RX ADMIN — ALBUTEROL SULFATE PRN MG: 2.5 SOLUTION RESPIRATORY (INHALATION) at 04:34

## 2019-04-28 RX ADMIN — HUMAN INSULIN SCH UNITS: 100 INJECTION, SOLUTION SUBCUTANEOUS at 06:00

## 2019-04-28 RX ADMIN — HUMAN INSULIN SCH UNITS: 100 INJECTION, SOLUTION SUBCUTANEOUS at 21:09

## 2019-04-28 RX ADMIN — SODIUM CHLORIDE SCH MLS/HR: 9 INJECTION, SOLUTION INTRAVENOUS at 20:24

## 2019-04-28 RX ADMIN — PROPOFOL SCH MLS/HR: 10 INJECTION, EMULSION INTRAVENOUS at 21:00

## 2019-04-28 RX ADMIN — Medication SCH STRIP: at 00:09

## 2019-04-28 RX ADMIN — AMIODARONE HYDROCHLORIDE SCH MLS/HR: 50 INJECTION, SOLUTION INTRAVENOUS at 21:41

## 2019-04-28 RX ADMIN — IPRATROPIUM BROMIDE PRN MG: 0.5 SOLUTION RESPIRATORY (INHALATION) at 04:34

## 2019-04-28 RX ADMIN — Medication SCH STRIP: at 20:31

## 2019-04-28 RX ADMIN — MIDAZOLAM SCH MLS/HR: 5 INJECTION, SOLUTION INTRAMUSCULAR; INTRAVENOUS at 21:00

## 2019-04-28 RX ADMIN — SODIUM CHLORIDE SCH MLS/HR: 9 INJECTION, SOLUTION INTRAVENOUS at 12:56

## 2019-04-28 RX ADMIN — Medication SCH STRIP: at 06:02

## 2019-04-28 RX ADMIN — PANTOPRAZOLE SODIUM SCH MG: 40 INJECTION, POWDER, FOR SOLUTION INTRAVENOUS at 10:00

## 2019-04-28 NOTE — NUR
DR. FERRARI AT BEDSIDE

Dr. Ferrari at bedside speaking with pt's son, Jose G. Updated on pt condition and no new orders 
received.

## 2019-04-28 NOTE — NUR
VISITORS

Pt's son, Jose G and Jose G's girlfriend at bedside. Updated on pt condition and answered all 
questions. Jose G verbalized understanding.

## 2019-04-28 NOTE — NUR
WIFE AT BEDSIDE

Pt's wife, Karen at bedside. Updated on pt condition and answered all questions. Karen 
verbalized understanding.

## 2019-04-28 NOTE — NUR
TEMPERATURE

PATIENTS TEMPERATURE DECREASING DESPITE WARMING BLANKET, WARMING BLANKETS APPLIED AND 
WARMING LIGHT TURNED ON. WILL CONTINUE TO MONITOR CLOSELY

## 2019-04-28 NOTE — NUR
SON AT BEDSIDE

Pt's son, Shar at bedside. Updated on pt condition. Shar verbalized understanding and 
had no questions.

## 2019-04-28 NOTE — NUR
TURNING HELD AT THIS TIME

PATIENT HEMODYNAMICS UNSTABLE, ON FiO2 100%, MAX RATE ON 2 VASOPRESSORS. WILL CONTINUE TO 
REASSESS

## 2019-04-28 NOTE — NUR
WOUND CARE:



CLEANSED ALL OPEN SKIN TEARS (RIGHT FOOT, RIGHT UPPER EXTREMITY, LEFT UPPER EXTREMITY) WITH 
CHG WIPES. COVERED WITH OPTIFOAM.



SCROTUM: CLEANSED WITH SOAP AND WATER. PAT DRY. COVERED WITH ABD PAD AND CHUX.

## 2019-04-28 NOTE — NUR
Nutrition Follow-up Notes 



Wt.: 101.0 kg based on bed scale today. Noted 6.8 kg weight gain in last 2 days likely d.t ? 
fluid retention aeb positive I & Os for past few days. 

Pt remains intubated, sedated ,a family friend at bedside during rounds earlier. Pt's 
currently NPO,with TPN @ 56 ml/hr providing 1400 kcal, 70 gms protein, 1120 NPCs and 7% Fat. 
Pt with inadequate PN support d/t mod initiation rate delivery of concentrated formula aeb 
current PN infusion meets 62% to 76% of est caloric needs and meets 77% to 93% of est 
protein needs.



Est. Needs: 1850 kcal to 2250 kcal (25-30 kcal/kgBW), 75 gms to 90 gms pro (1.0-1.2 gms/kgBW 
reassessed r/t severe hypoalbuminemia/elev RFT, improving). Will continue to monitor 
pertinent labs and reassess nutrient need prn



Labs: Gluc 163 H, BUN 68 H, Cr 2.50 H, Ca 7.7 L, Mg 2.7 H, Tpro 3.8 L, Alb 1.1 L; Prealb 
<3.0 L, Trig 207 H



Skin: Onur scale 8, high risk, pt's right arm open blisters, left right sacrum skin tear, 
R. L ear ? DTI per RN documentation. Pls refer to wound care RN's notes today for further 
details re: tx plans.

 

GI: Pt has no bowel activity since 4/21/19, on OGT to [facility], had gastric drainage 350 ml this 
morning per RN documentation. 



PES:

Increased nutrient needs r/t current/chronic medical condition aeb intubated, sedated, s/p 
surgery, severe hypoalbuminemia, NPO.

Altered nutrition related lab values r/t current/chronic medical condition aeb 
hyperglycemia, hyponatremia, hypocapnia, hypochloremia, elev. 

renal labs, HbA1c, AST, hypocalcemia and severe hypoalbuminemia



Will continue to monitor NPO status, PN tolerance, skin status, pertinent labs and weight 
trend. F/u in 2 to 3 days.

Rec.: 1.) If still NPO with PN support, consider gradual increase on calories to meet at 
least 75% of needs. 2.) Advance gradually to oral diet when medically appropriate. 3.) Refer 
to CDE/RD for further nutrition education and weight monitoring upon discharged. 4.) 
Continue current plan of care.

## 2019-04-28 NOTE — NUR
DR. FERRARI AT BEDSIDE WITH PT'S WIFE AND SON

Dr. Ferrari at bedside speaking with pt's wife, Karen and son Jose G.

## 2019-04-28 NOTE — NUR
DR PEREZ CALLED TO SPEAK WITH PATIENTS ELLIE GARCÍA AT PATIENTS WIFE REQUEST.

MD DISCUSSED PATIENTS CONDITION AND PLAN OF CARE

## 2019-04-28 NOTE — NUR
CRITICAL PLATELET LEVEL 5:



PATIENT WITH KNOWN THROMBOCYTOPENIA, WILL PASS ON TO DAY SHIFT. PATIENT NOTED WITH PETECHIAE

## 2019-04-28 NOTE — NUR
OPENING SHIFT NOTE

Received report from TAWANA Haas. Pt resting in bed, intubated with sedation and 
vasopressors infusing. Pt receiving maximum dose of Levophed, high dose of Ej-Synephrine, 
and FiO2 of 85%; turning held at this time. Will continue to reassess. See IV spreadsheet 
and completed physical assessment intervention. Bed locked, in lowest position with top two 
side rails up, and call light within reach. All alarms on and audible. Will continue to 
monitor pt.

## 2019-04-29 VITALS — DIASTOLIC BLOOD PRESSURE: 51 MMHG | SYSTOLIC BLOOD PRESSURE: 125 MMHG

## 2019-04-29 VITALS — SYSTOLIC BLOOD PRESSURE: 126 MMHG | DIASTOLIC BLOOD PRESSURE: 38 MMHG

## 2019-04-29 VITALS — SYSTOLIC BLOOD PRESSURE: 135 MMHG | DIASTOLIC BLOOD PRESSURE: 49 MMHG

## 2019-04-29 VITALS — DIASTOLIC BLOOD PRESSURE: 41 MMHG | SYSTOLIC BLOOD PRESSURE: 118 MMHG

## 2019-04-29 VITALS — SYSTOLIC BLOOD PRESSURE: 128 MMHG | DIASTOLIC BLOOD PRESSURE: 32 MMHG

## 2019-04-29 VITALS — DIASTOLIC BLOOD PRESSURE: 50 MMHG | SYSTOLIC BLOOD PRESSURE: 126 MMHG

## 2019-04-29 VITALS — DIASTOLIC BLOOD PRESSURE: 47 MMHG | SYSTOLIC BLOOD PRESSURE: 125 MMHG

## 2019-04-29 VITALS — SYSTOLIC BLOOD PRESSURE: 86 MMHG | DIASTOLIC BLOOD PRESSURE: 35 MMHG

## 2019-04-29 VITALS — SYSTOLIC BLOOD PRESSURE: 133 MMHG | DIASTOLIC BLOOD PRESSURE: 38 MMHG

## 2019-04-29 VITALS — DIASTOLIC BLOOD PRESSURE: 41 MMHG | SYSTOLIC BLOOD PRESSURE: 134 MMHG

## 2019-04-29 VITALS — DIASTOLIC BLOOD PRESSURE: 32 MMHG | SYSTOLIC BLOOD PRESSURE: 98 MMHG

## 2019-04-29 VITALS — DIASTOLIC BLOOD PRESSURE: 40 MMHG | SYSTOLIC BLOOD PRESSURE: 118 MMHG

## 2019-04-29 VITALS — DIASTOLIC BLOOD PRESSURE: 37 MMHG | SYSTOLIC BLOOD PRESSURE: 133 MMHG

## 2019-04-29 VITALS — SYSTOLIC BLOOD PRESSURE: 108 MMHG | DIASTOLIC BLOOD PRESSURE: 34 MMHG

## 2019-04-29 VITALS — SYSTOLIC BLOOD PRESSURE: 133 MMHG | DIASTOLIC BLOOD PRESSURE: 41 MMHG

## 2019-04-29 VITALS — SYSTOLIC BLOOD PRESSURE: 114 MMHG | DIASTOLIC BLOOD PRESSURE: 40 MMHG

## 2019-04-29 VITALS — DIASTOLIC BLOOD PRESSURE: 39 MMHG | SYSTOLIC BLOOD PRESSURE: 112 MMHG

## 2019-04-29 VITALS — SYSTOLIC BLOOD PRESSURE: 134 MMHG | DIASTOLIC BLOOD PRESSURE: 39 MMHG

## 2019-04-29 VITALS — SYSTOLIC BLOOD PRESSURE: 128 MMHG | DIASTOLIC BLOOD PRESSURE: 37 MMHG

## 2019-04-29 VITALS — DIASTOLIC BLOOD PRESSURE: 45 MMHG | SYSTOLIC BLOOD PRESSURE: 127 MMHG

## 2019-04-29 VITALS — DIASTOLIC BLOOD PRESSURE: 40 MMHG | SYSTOLIC BLOOD PRESSURE: 120 MMHG

## 2019-04-29 VITALS — DIASTOLIC BLOOD PRESSURE: 34 MMHG | SYSTOLIC BLOOD PRESSURE: 125 MMHG

## 2019-04-29 VITALS — DIASTOLIC BLOOD PRESSURE: 34 MMHG | SYSTOLIC BLOOD PRESSURE: 105 MMHG

## 2019-04-29 VITALS — DIASTOLIC BLOOD PRESSURE: 49 MMHG | SYSTOLIC BLOOD PRESSURE: 127 MMHG

## 2019-04-29 VITALS — DIASTOLIC BLOOD PRESSURE: 41 MMHG | SYSTOLIC BLOOD PRESSURE: 126 MMHG

## 2019-04-29 VITALS — SYSTOLIC BLOOD PRESSURE: 130 MMHG | DIASTOLIC BLOOD PRESSURE: 40 MMHG

## 2019-04-29 VITALS — SYSTOLIC BLOOD PRESSURE: 136 MMHG | DIASTOLIC BLOOD PRESSURE: 43 MMHG

## 2019-04-29 VITALS — SYSTOLIC BLOOD PRESSURE: 120 MMHG | DIASTOLIC BLOOD PRESSURE: 38 MMHG

## 2019-04-29 VITALS — SYSTOLIC BLOOD PRESSURE: 128 MMHG | DIASTOLIC BLOOD PRESSURE: 38 MMHG

## 2019-04-29 VITALS — SYSTOLIC BLOOD PRESSURE: 111 MMHG | DIASTOLIC BLOOD PRESSURE: 31 MMHG

## 2019-04-29 VITALS — SYSTOLIC BLOOD PRESSURE: 125 MMHG | DIASTOLIC BLOOD PRESSURE: 47 MMHG

## 2019-04-29 VITALS — SYSTOLIC BLOOD PRESSURE: 137 MMHG | DIASTOLIC BLOOD PRESSURE: 40 MMHG

## 2019-04-29 VITALS — SYSTOLIC BLOOD PRESSURE: 125 MMHG | DIASTOLIC BLOOD PRESSURE: 38 MMHG

## 2019-04-29 VITALS — DIASTOLIC BLOOD PRESSURE: 38 MMHG | SYSTOLIC BLOOD PRESSURE: 112 MMHG

## 2019-04-29 VITALS — DIASTOLIC BLOOD PRESSURE: 62 MMHG | SYSTOLIC BLOOD PRESSURE: 125 MMHG

## 2019-04-29 VITALS — SYSTOLIC BLOOD PRESSURE: 98 MMHG | DIASTOLIC BLOOD PRESSURE: 38 MMHG

## 2019-04-29 VITALS — SYSTOLIC BLOOD PRESSURE: 109 MMHG | DIASTOLIC BLOOD PRESSURE: 62 MMHG

## 2019-04-29 VITALS — SYSTOLIC BLOOD PRESSURE: 132 MMHG | DIASTOLIC BLOOD PRESSURE: 41 MMHG

## 2019-04-29 VITALS — SYSTOLIC BLOOD PRESSURE: 119 MMHG | DIASTOLIC BLOOD PRESSURE: 31 MMHG

## 2019-04-29 VITALS — SYSTOLIC BLOOD PRESSURE: 123 MMHG | DIASTOLIC BLOOD PRESSURE: 40 MMHG

## 2019-04-29 VITALS — SYSTOLIC BLOOD PRESSURE: 147 MMHG | DIASTOLIC BLOOD PRESSURE: 45 MMHG

## 2019-04-29 VITALS — SYSTOLIC BLOOD PRESSURE: 136 MMHG | DIASTOLIC BLOOD PRESSURE: 51 MMHG

## 2019-04-29 VITALS — SYSTOLIC BLOOD PRESSURE: 147 MMHG | DIASTOLIC BLOOD PRESSURE: 46 MMHG

## 2019-04-29 VITALS — SYSTOLIC BLOOD PRESSURE: 114 MMHG | DIASTOLIC BLOOD PRESSURE: 42 MMHG

## 2019-04-29 VITALS — SYSTOLIC BLOOD PRESSURE: 124 MMHG | DIASTOLIC BLOOD PRESSURE: 37 MMHG

## 2019-04-29 VITALS — SYSTOLIC BLOOD PRESSURE: 118 MMHG | DIASTOLIC BLOOD PRESSURE: 40 MMHG

## 2019-04-29 VITALS — SYSTOLIC BLOOD PRESSURE: 86 MMHG | DIASTOLIC BLOOD PRESSURE: 31 MMHG

## 2019-04-29 VITALS — DIASTOLIC BLOOD PRESSURE: 44 MMHG | SYSTOLIC BLOOD PRESSURE: 130 MMHG

## 2019-04-29 VITALS — DIASTOLIC BLOOD PRESSURE: 45 MMHG | SYSTOLIC BLOOD PRESSURE: 140 MMHG

## 2019-04-29 VITALS — DIASTOLIC BLOOD PRESSURE: 36 MMHG | SYSTOLIC BLOOD PRESSURE: 118 MMHG

## 2019-04-29 VITALS — DIASTOLIC BLOOD PRESSURE: 37 MMHG | SYSTOLIC BLOOD PRESSURE: 112 MMHG

## 2019-04-29 VITALS — SYSTOLIC BLOOD PRESSURE: 133 MMHG | DIASTOLIC BLOOD PRESSURE: 47 MMHG

## 2019-04-29 VITALS — SYSTOLIC BLOOD PRESSURE: 126 MMHG | DIASTOLIC BLOOD PRESSURE: 39 MMHG

## 2019-04-29 VITALS — SYSTOLIC BLOOD PRESSURE: 127 MMHG | DIASTOLIC BLOOD PRESSURE: 40 MMHG

## 2019-04-29 VITALS — SYSTOLIC BLOOD PRESSURE: 104 MMHG | DIASTOLIC BLOOD PRESSURE: 44 MMHG

## 2019-04-29 VITALS — SYSTOLIC BLOOD PRESSURE: 121 MMHG | DIASTOLIC BLOOD PRESSURE: 41 MMHG

## 2019-04-29 VITALS — DIASTOLIC BLOOD PRESSURE: 42 MMHG | SYSTOLIC BLOOD PRESSURE: 132 MMHG

## 2019-04-29 VITALS — SYSTOLIC BLOOD PRESSURE: 129 MMHG | DIASTOLIC BLOOD PRESSURE: 34 MMHG

## 2019-04-29 VITALS — DIASTOLIC BLOOD PRESSURE: 44 MMHG | SYSTOLIC BLOOD PRESSURE: 123 MMHG

## 2019-04-29 VITALS — SYSTOLIC BLOOD PRESSURE: 125 MMHG | DIASTOLIC BLOOD PRESSURE: 39 MMHG

## 2019-04-29 LAB
ALBUMIN SERPL-MCNC: 1 G/DL (ref 3.4–5)
ALP SERPL-CCNC: 70 U/L (ref 45–117)
ALT SERPL-CCNC: 11 U/L (ref 16–61)
ANION GAP SERPL CALCULATED.3IONS-SCNC: 10 MMOL/L (ref 5–15)
BILIRUB SERPL-MCNC: 7.2 MG/DL (ref 0.2–1)
BUN SERPL-MCNC: 79 MG/DL (ref 7–18)
BUN/CREAT SERPL: 25.9
CALCIUM SERPL-MCNC: 8.4 MG/DL (ref 8.5–10.1)
CHLORIDE SERPL-SCNC: 102 MMOL/L (ref 98–107)
CO2 SERPL-SCNC: 21 MMOL/L (ref 21–32)
GLUCOSE SERPL-MCNC: 175 MG/DL (ref 74–106)
MAGNESIUM SERPL-MCNC: 2.6 MG/DL (ref 1.6–2.6)
POTASSIUM SERPL-SCNC: 4.9 MMOL/L (ref 3.5–5.1)
PROT SERPL-MCNC: 3.7 G/DL (ref 6.4–8.2)
SODIUM SERPL-SCNC: 133 MMOL/L (ref 136–145)

## 2019-04-29 RX ADMIN — FENTANYL CITRATE SCH MLS/HR: 50 INJECTION, SOLUTION INTRAMUSCULAR; INTRAVENOUS at 00:46

## 2019-04-29 RX ADMIN — Medication SCH STRIP: at 12:07

## 2019-04-29 RX ADMIN — PANTOPRAZOLE SODIUM SCH MG: 40 INJECTION, POWDER, FOR SOLUTION INTRAVENOUS at 09:32

## 2019-04-29 RX ADMIN — HUMAN INSULIN SCH UNITS: 100 INJECTION, SOLUTION SUBCUTANEOUS at 05:48

## 2019-04-29 RX ADMIN — SODIUM CHLORIDE SCH MLS/HR: 0.9 INJECTION, SOLUTION INTRAVENOUS at 07:50

## 2019-04-29 RX ADMIN — SODIUM CHLORIDE SCH MLS/HR: 0.9 INJECTION, SOLUTION INTRAVENOUS at 01:27

## 2019-04-29 RX ADMIN — Medication SCH STRIP: at 00:42

## 2019-04-29 RX ADMIN — SODIUM CHLORIDE SCH MLS/HR: 9 INJECTION, SOLUTION INTRAVENOUS at 11:54

## 2019-04-29 RX ADMIN — DEXTROSE SCH MLS/HR: 50 INJECTION, SOLUTION INTRAVENOUS at 10:30

## 2019-04-29 RX ADMIN — HUMAN INSULIN SCH UNITS: 100 INJECTION, SOLUTION SUBCUTANEOUS at 12:11

## 2019-04-29 RX ADMIN — HUMAN INSULIN SCH UNITS: 100 INJECTION, SOLUTION SUBCUTANEOUS at 00:45

## 2019-04-29 RX ADMIN — Medication SCH STRIP: at 05:36

## 2019-04-29 RX ADMIN — MEROPENEM SCH MLS/HR: 1 INJECTION, POWDER, FOR SOLUTION INTRAVENOUS at 04:26

## 2019-04-29 NOTE — NUR
TERMINAL WEAN;

All family present at bedside, ready for terminal extubation.  Dave CHRISTIANSEN at bedside.  Patient 
medicated as per comfort care orders with Dilaudid and Ativan IV.  All family wishes to 
remain present in room during extubation.  

-------------------------------------------------------------------------------

Addendum: 04/29/19 at 1812 by Alex Ware RN

-------------------------------------------------------------------------------

All drips turned off prior to removal of ETT.

## 2019-04-29 NOTE — NUR
FAMILY AT BEDSIDE

Pt's wife, Rosario, son, and additional family members at bedside. Updated Rosario on 's 
status and answered all questions. Rosario verbalized understanding.

## 2019-04-29 NOTE — NUR
Call placed to  for clearance for further removal of lines.  Patient information 
given, await return call.

## 2019-04-29 NOTE — NUR
BARRIER CREAM/SACRAL SKIN CARE;

Due to instability patient is not able to tolerate turning required to perform skin care to 
sacral area.  

-------------------------------------------------------------------------------

Addendum: 04/29/19 at 1323 by Alex Ware RN

-------------------------------------------------------------------------------

Amended: Links added.

## 2019-04-29 NOTE — NUR
BATH/LINEN CHANGE/DRESSING CHANGES

Pt given complete CHG bath. Skin integrity assessed for any changes, additional blisters to 
BLE and new opened blister to right forearm noted. Dressings to open blisters on right arm, 
left elbow, right foot, midabdominal incision, and left abdominal ELY drain insertion site 
changed using sterile technique. Linens changed. Pt repositioned for comfort. Pt tolerated 
fairly.

## 2019-04-29 NOTE — NUR
INITIAL/ONGOING ASSESSMENT;

Patient with large areas of petechiae over torso, fingers noted to be blackened and 
shriveled, toes and feet pale/purple.  Patient with multiple multiple intact and denuded 
blisters on upper and lower extremities.  Currently on high dose vasopressor support, see IV 
spreadsheet.  A-line in place to right femoral, line zeroed, good waveform tracing.  Plan 
per report is for tentative terminal wean today when family is ready.

## 2019-04-29 NOTE — NUR
PHONE CALL FROM [facility]

Received phone call from Irene with [facility]. Updated on pt status and informed Irene of 
return phone call pending from . Irene stated she will call back at a later time.

## 2019-04-29 NOTE — NUR
MD AT BEDSIDE:

Dr. Busby at bedside, spoke with patient's wife Rosario.  Plan for terminal extubation when 
family is ready, all drips to continue until family is ready to proceed.

## 2019-04-29 NOTE — NUR
PHONE CALL FROM 'S OFFICE

Received phone call from Deputy Vickie Biggs. Answered all questions from Deputy Biggs. 
Per Deputy Biggs, this will be a 's case. Deputy Biggs requested pt's chart to be 
faxed to [phone].

## 2019-04-29 NOTE — NUR
I called [facility] and spoke with  Nuria to give her a verbal update on the plan of 
care for this patient.

## 2019-04-29 NOTE — NUR
ASYSTOLE:

Patient asystole, strip printed.  Family notified that patient no long has a heart beat.  
Ata Tobar NP paged to pronounce.

## 2019-04-29 NOTE — NUR
PHONE CALL FROM WIFE

Received phone call from pt's wife, Rosario who provided the correct password. Updated on pt 
condition and answered all questions. Rosario verbalized understanding and stated she will try 
to come in at 0830 this morning.

## 2019-04-29 NOTE — NUR
FAMILY AT BEDSIDE:

Patient's wife Rosario at bedside, states that the plan for withdrawal of care today.  
Discussed with her that there is no pressure and that everything should be done as the 
family is ready.  Updated her that there has been no improvement in patient condition and 
that he is requiring slightly more vasopressors than yesterday.  Await Dr. Busby to make 
rounds.

## 2019-04-29 NOTE — NUR
MD AT BEDSIDE:

Dr. Schmid at bedside, informed him of tentative plan for a terminal wean today.  No 
further orders at this time.

## 2019-04-29 NOTE — NUR
UNABLE TO FAX REQUESTED DOCUMENTS

Received no response when attempt was made to fax Deputy Biggs's requested chart. Called and 
spoke with Deputy Biggs who provided a second fax number of [phone]. Will attempt to 
fax requested documents again.

## 2019-04-29 NOTE — NUR
[facility] NO LONGER PURSUING DONATION

Received phone call from Elizabeth with [facility] who stated they are no longer pursuing 
donation (case number [id number]).

## 2019-04-29 NOTE — NUR
OPENING SHIFT NOTE

Received report from TAWANA Johnson. Pt in bed with all lines in place. No family at bedside. 
Awaiting return phone calls from [facility] and the .

## 2019-04-29 NOTE — NUR
[facility];

Call placed to [facility], state that patient possible candidate for corneal donation.  
Reference number [id number].

## 2019-04-29 NOTE — NUR
[facility]

Received phone call from Elizabeth with [facility]. Updated on pt's status. Notified Elizabeth 
that the  has not called back yet. Elizabeth verbalized understanding and stated she 
will call back at a later time.

## 2019-04-30 NOTE — NUR
CASE NUMBER FROM 'S OFFICE

Received return phone call from Deputy Biggs. Deputy Biggs notified of the morgue being full 
and [facility] of the [facility] will do a courtesy hold. Per Deputy Biggs, pt 
is okay to be held at [facility] for a courtesy hold but all lines and tubes 
cannot be removed. Deputy Biggs provided case number of [id number].

## 2019-04-30 NOTE — NUR
[facility] PICK-UP

[facility]' technicians transported pt off unit for courtesy hold. All lines and 
tubes remained in place.d

## 2019-04-30 NOTE — NUR
ETA FROM [facility]

Received call from Enrique with [facility]. Per Enrique, the ETA is within 60 
minutes for pt pick-up.

## 2019-04-30 NOTE — NUR
'S REQUESTED DOCUMENTS FAX COMPLETED

[facility]'s requested documentation fax completed. Awaiting return phone call.

## 2021-09-15 NOTE — NUR
Family updated on pt status

Family of SHANTA GERAROD updated on patient's status and condition. All questions and 
concerns addressed. Patient's sister verbalized understanding. To get better and follow your care plan as instructed.

## 2023-12-07 NOTE — NUR
SPOKE WITH RIP MAHAN NP:



NOTIFIED OF DR. GUILLORY'S ASSESSMENT OF POOR PROGNOSIS AND FAMILY WISHES FOR DNR STATUS. PER 
RIP MAHAN, SHE WILL BE BY SHORTLY. x 5 x 5 mm blush of contrast enhancement in the right basal ganglia with  small leading vessel, which is suspicious for a small AVM or possibly a DVA. Recommend MRA head for further evaluation. CTA Neck:  1. No evidence of flow-limiting stenosis. 2. Unchanged mild stenosis (less than 50% by NASCET criteria) of the proximal  bilateral internal carotid arteries. 3. 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 Recommend ENT consultation  and direct visualization if not previously performed. He was given 325mg Aspirin and was admitted under the Hospitalist service for further management. Please see H&P for additional details. Brief Hospital Course by Main Problems: On the acute medicine floor he was continued on aspirin/statin/Plavix therapy. I discussed all imaging findings with patient and recommended transferring to [facility] for evaluation by neuro interventional radiology + ENT however patient declined. MRA brain noted:    IMPRESSION:  MRI: Right thalamic acute versus subacute lacunar infarct. Chronic right PCA territory infarct. 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 No intracranial aneurysm. Per Neurology recommendation was for continuation of aspirin/statin/plavix therapy, stressed both compliance and immediate tobacco cessation. An outpatient follow up was arranged for ENT + Neuro interventional Radiology. At time of discharge patient was medically stable. Discharge Exam:  Patient seen and examined by me on discharge day.   Pertinent Findings:  Patient Vitals for the past 24 hrs:   BP Temp Temp src Pulse Resp SpO2   12/07/23 1611 -- -- -- 83 -- --   12/07/23 1551 -- -- -- 85 -- --   12/07/23 1323 -- -- -- -- 18 --   12/07/23 1233 131/83 -- -- 72 -- 100 %   12/07/23 1220 -- -- -- 66 -- --